# Patient Record
Sex: FEMALE | Race: WHITE | NOT HISPANIC OR LATINO | Employment: OTHER | URBAN - METROPOLITAN AREA
[De-identification: names, ages, dates, MRNs, and addresses within clinical notes are randomized per-mention and may not be internally consistent; named-entity substitution may affect disease eponyms.]

---

## 2024-01-18 ENCOUNTER — APPOINTMENT (OUTPATIENT)
Dept: RADIOLOGY | Facility: HOSPITAL | Age: 74
End: 2024-01-18
Payer: MEDICARE

## 2024-01-18 ENCOUNTER — PHARMACY VISIT (OUTPATIENT)
Dept: PHARMACY | Facility: CLINIC | Age: 74
End: 2024-01-18

## 2024-01-18 ENCOUNTER — APPOINTMENT (OUTPATIENT)
Dept: CARDIOLOGY | Facility: HOSPITAL | Age: 74
End: 2024-01-18
Payer: MEDICARE

## 2024-01-18 ENCOUNTER — HOSPITAL ENCOUNTER (EMERGENCY)
Facility: HOSPITAL | Age: 74
Discharge: HOME | End: 2024-01-18
Attending: EMERGENCY MEDICINE
Payer: MEDICARE

## 2024-01-18 VITALS
DIASTOLIC BLOOD PRESSURE: 80 MMHG | OXYGEN SATURATION: 95 % | WEIGHT: 230 LBS | RESPIRATION RATE: 21 BRPM | BODY MASS INDEX: 40.75 KG/M2 | TEMPERATURE: 98.2 F | SYSTOLIC BLOOD PRESSURE: 129 MMHG | HEART RATE: 100 BPM | HEIGHT: 63 IN

## 2024-01-18 DIAGNOSIS — J18.9 PNEUMONIA DUE TO INFECTIOUS ORGANISM, UNSPECIFIED LATERALITY, UNSPECIFIED PART OF LUNG: ICD-10-CM

## 2024-01-18 DIAGNOSIS — J06.9 UPPER RESPIRATORY TRACT INFECTION, UNSPECIFIED TYPE: ICD-10-CM

## 2024-01-18 DIAGNOSIS — R06.02 SHORTNESS OF BREATH: Primary | ICD-10-CM

## 2024-01-18 PROBLEM — E03.9 ACQUIRED HYPOTHYROIDISM: Status: ACTIVE | Noted: 2021-06-21

## 2024-01-18 PROBLEM — D47.2 MONOCLONAL GAMMOPATHY OF UNKNOWN SIGNIFICANCE (MGUS): Status: ACTIVE | Noted: 2018-04-16

## 2024-01-18 LAB
ALBUMIN SERPL BCP-MCNC: 3.9 G/DL (ref 3.4–5)
ALP SERPL-CCNC: 64 U/L (ref 33–136)
ALT SERPL W P-5'-P-CCNC: 20 U/L (ref 7–45)
ANION GAP SERPL CALC-SCNC: 14 MMOL/L (ref 10–20)
AST SERPL W P-5'-P-CCNC: 19 U/L (ref 9–39)
BILIRUB SERPL-MCNC: 0.7 MG/DL (ref 0–1.2)
BNP SERPL-MCNC: 43 PG/ML (ref 0–99)
BUN SERPL-MCNC: 14 MG/DL (ref 6–23)
CALCIUM SERPL-MCNC: 9.3 MG/DL (ref 8.6–10.3)
CARDIAC TROPONIN I PNL SERPL HS: 11 NG/L (ref 0–13)
CHLORIDE SERPL-SCNC: 99 MMOL/L (ref 98–107)
CO2 SERPL-SCNC: 25 MMOL/L (ref 21–32)
CREAT SERPL-MCNC: 0.99 MG/DL (ref 0.5–1.05)
EGFRCR SERPLBLD CKD-EPI 2021: 60 ML/MIN/1.73M*2
FLUAV RNA RESP QL NAA+PROBE: NOT DETECTED
FLUBV RNA RESP QL NAA+PROBE: NOT DETECTED
GLUCOSE SERPL-MCNC: 130 MG/DL (ref 74–99)
INR PPP: 1.1 (ref 0.9–1.1)
MAGNESIUM SERPL-MCNC: 1.6 MG/DL (ref 1.6–2.4)
POTASSIUM SERPL-SCNC: 3.8 MMOL/L (ref 3.5–5.3)
PROT SERPL-MCNC: 7.5 G/DL (ref 6.4–8.2)
PROTHROMBIN TIME: 12.9 SECONDS (ref 9.8–12.8)
RSV RNA RESP QL NAA+PROBE: NOT DETECTED
SARS-COV-2 RNA RESP QL NAA+PROBE: NOT DETECTED
SODIUM SERPL-SCNC: 134 MMOL/L (ref 136–145)

## 2024-01-18 PROCEDURE — 85610 PROTHROMBIN TIME: CPT | Performed by: EMERGENCY MEDICINE

## 2024-01-18 PROCEDURE — RXMED WILLOW AMBULATORY MEDICATION CHARGE

## 2024-01-18 PROCEDURE — 36415 COLL VENOUS BLD VENIPUNCTURE: CPT | Performed by: EMERGENCY MEDICINE

## 2024-01-18 PROCEDURE — 87637 SARSCOV2&INF A&B&RSV AMP PRB: CPT | Performed by: EMERGENCY MEDICINE

## 2024-01-18 PROCEDURE — 99284 EMERGENCY DEPT VISIT MOD MDM: CPT | Performed by: EMERGENCY MEDICINE

## 2024-01-18 PROCEDURE — 83880 ASSAY OF NATRIURETIC PEPTIDE: CPT | Performed by: EMERGENCY MEDICINE

## 2024-01-18 PROCEDURE — 84484 ASSAY OF TROPONIN QUANT: CPT | Performed by: EMERGENCY MEDICINE

## 2024-01-18 PROCEDURE — 93005 ELECTROCARDIOGRAM TRACING: CPT

## 2024-01-18 PROCEDURE — 80053 COMPREHEN METABOLIC PANEL: CPT | Performed by: EMERGENCY MEDICINE

## 2024-01-18 PROCEDURE — 99283 EMERGENCY DEPT VISIT LOW MDM: CPT

## 2024-01-18 PROCEDURE — 71046 X-RAY EXAM CHEST 2 VIEWS: CPT | Performed by: STUDENT IN AN ORGANIZED HEALTH CARE EDUCATION/TRAINING PROGRAM

## 2024-01-18 PROCEDURE — 71046 X-RAY EXAM CHEST 2 VIEWS: CPT

## 2024-01-18 PROCEDURE — 2500000001 HC RX 250 WO HCPCS SELF ADMINISTERED DRUGS (ALT 637 FOR MEDICARE OP): Performed by: EMERGENCY MEDICINE

## 2024-01-18 PROCEDURE — 83735 ASSAY OF MAGNESIUM: CPT | Performed by: EMERGENCY MEDICINE

## 2024-01-18 PROCEDURE — 2500000001 HC RX 250 WO HCPCS SELF ADMINISTERED DRUGS (ALT 637 FOR MEDICARE OP)

## 2024-01-18 RX ORDER — DOXYCYCLINE 100 MG/1
100 CAPSULE ORAL 2 TIMES DAILY
Qty: 13 CAPSULE | Refills: 0 | Status: SHIPPED | OUTPATIENT
Start: 2024-01-18 | End: 2024-01-25

## 2024-01-18 RX ORDER — METOCLOPRAMIDE 10 MG/1
5 TABLET ORAL ONCE
Status: COMPLETED | OUTPATIENT
Start: 2024-01-18 | End: 2024-01-18

## 2024-01-18 RX ORDER — ROSUVASTATIN CALCIUM 5 MG/1
5 TABLET, COATED ORAL
COMMUNITY
Start: 2023-07-06

## 2024-01-18 RX ORDER — METOPROLOL TARTRATE 50 MG/1
1 TABLET ORAL 2 TIMES DAILY
COMMUNITY
Start: 2023-07-06

## 2024-01-18 RX ORDER — LISINOPRIL 40 MG/1
40 TABLET ORAL
COMMUNITY
Start: 2023-07-06

## 2024-01-18 RX ORDER — LEVOTHYROXINE SODIUM 75 UG/1
75 TABLET ORAL
COMMUNITY
Start: 2023-07-06

## 2024-01-18 RX ORDER — DOXYCYCLINE HYCLATE 100 MG
100 TABLET ORAL ONCE
Status: COMPLETED | OUTPATIENT
Start: 2024-01-18 | End: 2024-01-18

## 2024-01-18 RX ORDER — NAPROXEN 500 MG/1
TABLET ORAL DAILY
COMMUNITY
Start: 2023-07-06

## 2024-01-18 RX ADMIN — METOCLOPRAMIDE 5 MG: 10 TABLET ORAL at 09:57

## 2024-01-18 RX ADMIN — DOXYCYCLINE HYCLATE 100 MG: 100 TABLET, COATED ORAL at 11:41

## 2024-01-18 ASSESSMENT — LIFESTYLE VARIABLES
HAVE PEOPLE ANNOYED YOU BY CRITICIZING YOUR DRINKING: NO
EVER HAD A DRINK FIRST THING IN THE MORNING TO STEADY YOUR NERVES TO GET RID OF A HANGOVER: NO
HAVE YOU EVER FELT YOU SHOULD CUT DOWN ON YOUR DRINKING: NO
EVER FELT BAD OR GUILTY ABOUT YOUR DRINKING: NO
REASON UNABLE TO ASSESS: NO

## 2024-01-18 ASSESSMENT — PAIN DESCRIPTION - LOCATION: LOCATION: CHEST

## 2024-01-18 ASSESSMENT — PAIN DESCRIPTION - ORIENTATION: ORIENTATION: LEFT

## 2024-01-18 ASSESSMENT — PAIN - FUNCTIONAL ASSESSMENT: PAIN_FUNCTIONAL_ASSESSMENT: 0-10

## 2024-01-18 ASSESSMENT — COLUMBIA-SUICIDE SEVERITY RATING SCALE - C-SSRS
6. HAVE YOU EVER DONE ANYTHING, STARTED TO DO ANYTHING, OR PREPARED TO DO ANYTHING TO END YOUR LIFE?: NO
1. IN THE PAST MONTH, HAVE YOU WISHED YOU WERE DEAD OR WISHED YOU COULD GO TO SLEEP AND NOT WAKE UP?: NO
2. HAVE YOU ACTUALLY HAD ANY THOUGHTS OF KILLING YOURSELF?: NO

## 2024-01-18 ASSESSMENT — PAIN DESCRIPTION - PAIN TYPE: TYPE: ACUTE PAIN

## 2024-01-18 ASSESSMENT — PAIN DESCRIPTION - DESCRIPTORS
DESCRIPTORS: ACHING
DESCRIPTORS: ACHING

## 2024-01-18 ASSESSMENT — PAIN SCALES - GENERAL: PAINLEVEL_OUTOF10: 5 - MODERATE PAIN

## 2024-01-18 NOTE — ED PROVIDER NOTES
HPI   Chief Complaint   Patient presents with   • Chest Pain       HPI  Sharon is a 73-year-old female with a past medical history of hypertension, hyperlipidemia, acquired hypothyroidism presenting to the ED with shortness of breath and chest pain.  Reports she has been sick since New Year's but got worse after attending her grandsons birthday party on Sunday.  She started becoming short of breath with some chest pain on Monday which has been progressively worsening.  Describes the chest pain as midsternal, radiating to the back, worse with coughing.  States the worst day was yesterday where she could not catch her breath, and was unable to sleep throughout the night due to her cough. States she is short of breath on exertion, when normally she is active.  When she is exerting herself, or going upstairs she becomes diaphoretic and unable to catch her breath.  Notes that she has a history of pleurisy, pneumonia and bronchitis where she only had cold symptoms with no fevers and then progressed to shortness of breath.  States that she has at home have 8 steps and notes that by the time she got up stairs yesterday she was drenched in sweat with difficulty breathing.  Reports that she has started sleeping on 3 pillows due to difficulty breathing and coughing when she lays flat. Denies any fevers, congestion, diarrhea, constipation, dysuria.  Admits to chills, shortness of breath, chest pain, headache, cough, sinus pressure, nausea with coughing. In addition endorses some mid lower abdominal pain but denies any dysuria or frequency, but states that she has urgency at baseline.  Reports her  is also sick requiring antibiotics.                    [place] Coma Scale Score: 15                  Patient History   History reviewed. No pertinent past medical history.  History reviewed. No pertinent surgical history.  No family history on file.  Social History     Tobacco Use   • Smoking status: Never   • Smokeless  "tobacco: Never   Substance Use Topics   • Alcohol use: Not Currently   • Drug use: Never       Physical Exam     Vitals:    01/18/24 0853 01/18/24 1000   BP: 152/86 129/80   Pulse: (!) 117 105   Resp: 20 (!) 27   Temp: 36.8 °C (98.2 °F)    TempSrc: Tympanic    SpO2: 96% 95%   Weight: 104 kg (230 lb)    Height: 1.6 m (5' 3\")         Physical Exam  CONSTITUTIONAL - well nourished, well developed, in mild acute distress  SKIN - normal skin color and pigmentation  HEAD - no trauma, normocephalic  EYES - extraocular muscles are intact, and normal external exam  ENT - normal tongue movement and throat normal, no exudate, nasal passage without discharge and patent  NECK - supple without rigidity, no neck mass was observed,   LUNG - clear to auscultation, no wheezing, no crackles and no rales, good effort  CARDIAC - tachycardic with regular rhythm, no skipped beats,   ABDOMEN - soft, nontender, nondistended, normal bowel sounds,   EXTREMITIES - no edema, no deformities  NEUROLOGICAL - alert, oriented and no focal signs  PSYCHIATRIC - alert, pleasant and cordial, age-appropriate      ED Course & MDM   Diagnoses as of 01/18/24 1114   Shortness of breath   Pneumonia due to infectious organism, unspecified laterality, unspecified part of lung   Upper respiratory tract infection, unspecified type       Medical Decision Making  Patient is a 73-year-old female who is presenting with shortness of breath and chest pain.  Patient has been sick since New Year's which worsened on Monday.  Vital signs on arrival with tachycardia but otherwise within normal limits.  Negative COVID/RSV/flu.  Chest x-ray with possible pneumonia, BNP, troponin both WNL.  Headache resolved with metoclopramide.     Walking pulse ox 93-95  Procedure  Procedures  "

## 2024-01-18 NOTE — ED TRIAGE NOTES
Patient c/o chest pain with SOB that started last night while she was bringing up dog food from the basement. Patient states she became very diaphoretic and could not catch her breath. This AM she states she has a cough with worsening CP and SOB.

## 2024-01-19 LAB
ATRIAL RATE: 115 BPM
P AXIS: 24 DEGREES
P OFFSET: 199 MS
P ONSET: 161 MS
PR INTERVAL: 128 MS
Q ONSET: 215 MS
QRS COUNT: 19 BEATS
QRS DURATION: 76 MS
QT INTERVAL: 302 MS
QTC CALCULATION(BAZETT): 417 MS
QTC FREDERICIA: 374 MS
R AXIS: -39 DEGREES
T AXIS: 26 DEGREES
T OFFSET: 366 MS
VENTRICULAR RATE: 115 BPM

## 2024-02-14 ENCOUNTER — HOSPITAL ENCOUNTER (OUTPATIENT)
Facility: HOSPITAL | Age: 74
Setting detail: OBSERVATION
Discharge: HOME | End: 2024-02-15
Attending: STUDENT IN AN ORGANIZED HEALTH CARE EDUCATION/TRAINING PROGRAM | Admitting: INTERNAL MEDICINE
Payer: MEDICARE

## 2024-02-14 ENCOUNTER — APPOINTMENT (OUTPATIENT)
Dept: CARDIOLOGY | Facility: HOSPITAL | Age: 74
End: 2024-02-14
Payer: MEDICARE

## 2024-02-14 ENCOUNTER — APPOINTMENT (OUTPATIENT)
Dept: RADIOLOGY | Facility: HOSPITAL | Age: 74
End: 2024-02-14
Payer: MEDICARE

## 2024-02-14 DIAGNOSIS — E01.0 THYROMEGALY: ICD-10-CM

## 2024-02-14 DIAGNOSIS — K04.7 DENTAL INFECTION: Primary | ICD-10-CM

## 2024-02-14 LAB
ALBUMIN SERPL BCP-MCNC: 4 G/DL (ref 3.4–5)
ALP SERPL-CCNC: 66 U/L (ref 33–136)
ALT SERPL W P-5'-P-CCNC: 18 U/L (ref 7–45)
ANION GAP SERPL CALC-SCNC: 13 MMOL/L (ref 10–20)
AST SERPL W P-5'-P-CCNC: 18 U/L (ref 9–39)
BASOPHILS # BLD AUTO: 0.04 X10*3/UL (ref 0–0.1)
BASOPHILS NFR BLD AUTO: 0.4 %
BILIRUB SERPL-MCNC: 0.6 MG/DL (ref 0–1.2)
BUN SERPL-MCNC: 14 MG/DL (ref 6–23)
CALCIUM SERPL-MCNC: 9.6 MG/DL (ref 8.6–10.3)
CARDIAC TROPONIN I PNL SERPL HS: 7 NG/L (ref 0–13)
CHLORIDE SERPL-SCNC: 102 MMOL/L (ref 98–107)
CO2 SERPL-SCNC: 26 MMOL/L (ref 21–32)
CREAT SERPL-MCNC: 0.96 MG/DL (ref 0.5–1.05)
EGFRCR SERPLBLD CKD-EPI 2021: 62 ML/MIN/1.73M*2
EOSINOPHIL # BLD AUTO: 0.05 X10*3/UL (ref 0–0.4)
EOSINOPHIL NFR BLD AUTO: 0.4 %
ERYTHROCYTE [DISTWIDTH] IN BLOOD BY AUTOMATED COUNT: 13.4 % (ref 11.5–14.5)
GLUCOSE SERPL-MCNC: 108 MG/DL (ref 74–99)
HCT VFR BLD AUTO: 47.2 % (ref 36–46)
HGB BLD-MCNC: 15.4 G/DL (ref 12–16)
IMM GRANULOCYTES # BLD AUTO: 0.03 X10*3/UL (ref 0–0.5)
IMM GRANULOCYTES NFR BLD AUTO: 0.3 % (ref 0–0.9)
LACTATE SERPL-SCNC: 0.7 MMOL/L (ref 0.4–2)
LYMPHOCYTES # BLD AUTO: 1.12 X10*3/UL (ref 0.8–3)
LYMPHOCYTES NFR BLD AUTO: 10.1 %
MAGNESIUM SERPL-MCNC: 1.97 MG/DL (ref 1.6–2.4)
MCH RBC QN AUTO: 28 PG (ref 26–34)
MCHC RBC AUTO-ENTMCNC: 32.6 G/DL (ref 32–36)
MCV RBC AUTO: 86 FL (ref 80–100)
MONOCYTES # BLD AUTO: 1.23 X10*3/UL (ref 0.05–0.8)
MONOCYTES NFR BLD AUTO: 11 %
NEUTROPHILS # BLD AUTO: 8.67 X10*3/UL (ref 1.6–5.5)
NEUTROPHILS NFR BLD AUTO: 77.8 %
NRBC BLD-RTO: 0 /100 WBCS (ref 0–0)
PLATELET # BLD AUTO: 192 X10*3/UL (ref 150–450)
POTASSIUM SERPL-SCNC: 4.2 MMOL/L (ref 3.5–5.3)
PROT SERPL-MCNC: 8 G/DL (ref 6.4–8.2)
RBC # BLD AUTO: 5.5 X10*6/UL (ref 4–5.2)
SODIUM SERPL-SCNC: 137 MMOL/L (ref 136–145)
TSH SERPL-ACNC: 0.55 MIU/L (ref 0.44–3.98)
WBC # BLD AUTO: 11.1 X10*3/UL (ref 4.4–11.3)

## 2024-02-14 PROCEDURE — 84439 ASSAY OF FREE THYROXINE: CPT

## 2024-02-14 PROCEDURE — 99285 EMERGENCY DEPT VISIT HI MDM: CPT | Mod: 25 | Performed by: STUDENT IN AN ORGANIZED HEALTH CARE EDUCATION/TRAINING PROGRAM

## 2024-02-14 PROCEDURE — 96375 TX/PRO/DX INJ NEW DRUG ADDON: CPT | Mod: 59

## 2024-02-14 PROCEDURE — 36415 COLL VENOUS BLD VENIPUNCTURE: CPT

## 2024-02-14 PROCEDURE — 93005 ELECTROCARDIOGRAM TRACING: CPT

## 2024-02-14 PROCEDURE — 94762 N-INVAS EAR/PLS OXIMTRY CONT: CPT

## 2024-02-14 PROCEDURE — 2500000004 HC RX 250 GENERAL PHARMACY W/ HCPCS (ALT 636 FOR OP/ED): Performed by: STUDENT IN AN ORGANIZED HEALTH CARE EDUCATION/TRAINING PROGRAM

## 2024-02-14 PROCEDURE — 84075 ASSAY ALKALINE PHOSPHATASE: CPT

## 2024-02-14 PROCEDURE — 85025 COMPLETE CBC W/AUTO DIFF WBC: CPT

## 2024-02-14 PROCEDURE — 87040 BLOOD CULTURE FOR BACTERIA: CPT | Mod: GEALAB

## 2024-02-14 PROCEDURE — 96366 THER/PROPH/DIAG IV INF ADDON: CPT | Mod: 59

## 2024-02-14 PROCEDURE — 2500000004 HC RX 250 GENERAL PHARMACY W/ HCPCS (ALT 636 FOR OP/ED)

## 2024-02-14 PROCEDURE — 2500000001 HC RX 250 WO HCPCS SELF ADMINISTERED DRUGS (ALT 637 FOR MEDICARE OP)

## 2024-02-14 PROCEDURE — 84484 ASSAY OF TROPONIN QUANT: CPT

## 2024-02-14 PROCEDURE — 83605 ASSAY OF LACTIC ACID: CPT

## 2024-02-14 PROCEDURE — 84443 ASSAY THYROID STIM HORMONE: CPT

## 2024-02-14 PROCEDURE — 70491 CT SOFT TISSUE NECK W/DYE: CPT | Performed by: RADIOLOGY

## 2024-02-14 PROCEDURE — 2550000001 HC RX 255 CONTRASTS: Performed by: STUDENT IN AN ORGANIZED HEALTH CARE EDUCATION/TRAINING PROGRAM

## 2024-02-14 PROCEDURE — G0378 HOSPITAL OBSERVATION PER HR: HCPCS

## 2024-02-14 PROCEDURE — 83735 ASSAY OF MAGNESIUM: CPT

## 2024-02-14 PROCEDURE — 96365 THER/PROPH/DIAG IV INF INIT: CPT | Mod: 59

## 2024-02-14 PROCEDURE — 85652 RBC SED RATE AUTOMATED: CPT

## 2024-02-14 PROCEDURE — 86140 C-REACTIVE PROTEIN: CPT

## 2024-02-14 PROCEDURE — 70491 CT SOFT TISSUE NECK W/DYE: CPT

## 2024-02-14 RX ORDER — KETOROLAC TROMETHAMINE 15 MG/ML
15 INJECTION, SOLUTION INTRAMUSCULAR; INTRAVENOUS EVERY 6 HOURS PRN
Status: DISCONTINUED | OUTPATIENT
Start: 2024-02-14 | End: 2024-02-15 | Stop reason: HOSPADM

## 2024-02-14 RX ORDER — ACETAMINOPHEN 500 MG
5 TABLET ORAL DAILY
Status: DISCONTINUED | OUTPATIENT
Start: 2024-02-14 | End: 2024-02-15 | Stop reason: HOSPADM

## 2024-02-14 RX ORDER — FAMOTIDINE 20 MG/1
20 TABLET, FILM COATED ORAL 2 TIMES DAILY
Status: DISCONTINUED | OUTPATIENT
Start: 2024-02-14 | End: 2024-02-15 | Stop reason: HOSPADM

## 2024-02-14 RX ORDER — FAMOTIDINE 10 MG/ML
20 INJECTION INTRAVENOUS 2 TIMES DAILY
Status: DISCONTINUED | OUTPATIENT
Start: 2024-02-14 | End: 2024-02-15 | Stop reason: HOSPADM

## 2024-02-14 RX ORDER — METRONIDAZOLE 500 MG/100ML
500 INJECTION, SOLUTION INTRAVENOUS EVERY 8 HOURS
Status: DISCONTINUED | OUTPATIENT
Start: 2024-02-14 | End: 2024-02-15

## 2024-02-14 RX ORDER — DIPHENHYDRAMINE HYDROCHLORIDE 50 MG/ML
25 INJECTION INTRAMUSCULAR; INTRAVENOUS ONCE
Status: COMPLETED | OUTPATIENT
Start: 2024-02-14 | End: 2024-02-14

## 2024-02-14 RX ORDER — KETOROLAC TROMETHAMINE 15 MG/ML
15 INJECTION, SOLUTION INTRAMUSCULAR; INTRAVENOUS ONCE
Status: COMPLETED | OUTPATIENT
Start: 2024-02-14 | End: 2024-02-14

## 2024-02-14 RX ORDER — DIPHENHYDRAMINE HCL 25 MG
50 CAPSULE ORAL ONCE
Status: COMPLETED | OUTPATIENT
Start: 2024-02-14 | End: 2024-02-14

## 2024-02-14 RX ORDER — METOPROLOL TARTRATE 25 MG/1
25 TABLET, FILM COATED ORAL 2 TIMES DAILY
Status: DISCONTINUED | OUTPATIENT
Start: 2024-02-14 | End: 2024-02-15 | Stop reason: HOSPADM

## 2024-02-14 RX ORDER — ACETAMINOPHEN 325 MG/1
975 TABLET ORAL ONCE
Status: COMPLETED | OUTPATIENT
Start: 2024-02-14 | End: 2024-02-14

## 2024-02-14 RX ORDER — FAMOTIDINE 10 MG/ML
20 INJECTION INTRAVENOUS ONCE
Status: COMPLETED | OUTPATIENT
Start: 2024-02-14 | End: 2024-02-14

## 2024-02-14 RX ORDER — MORPHINE SULFATE 2 MG/ML
2 INJECTION, SOLUTION INTRAMUSCULAR; INTRAVENOUS EVERY 4 HOURS PRN
Status: DISCONTINUED | OUTPATIENT
Start: 2024-02-14 | End: 2024-02-15 | Stop reason: HOSPADM

## 2024-02-14 RX ORDER — POLYETHYLENE GLYCOL 3350 17 G/17G
17 POWDER, FOR SOLUTION ORAL DAILY
Status: DISCONTINUED | OUTPATIENT
Start: 2024-02-14 | End: 2024-02-15 | Stop reason: HOSPADM

## 2024-02-14 RX ORDER — LEVOFLOXACIN 5 MG/ML
750 INJECTION, SOLUTION INTRAVENOUS EVERY 24 HOURS
Status: DISCONTINUED | OUTPATIENT
Start: 2024-02-14 | End: 2024-02-15

## 2024-02-14 RX ORDER — OXYCODONE HYDROCHLORIDE 5 MG/1
5 TABLET ORAL ONCE
Status: COMPLETED | OUTPATIENT
Start: 2024-02-14 | End: 2024-02-14

## 2024-02-14 RX ORDER — ALBUTEROL SULFATE 0.83 MG/ML
2.5 SOLUTION RESPIRATORY (INHALATION) EVERY 6 HOURS PRN
Status: DISCONTINUED | OUTPATIENT
Start: 2024-02-14 | End: 2024-02-15

## 2024-02-14 RX ORDER — METOPROLOL TARTRATE 50 MG/1
50 TABLET ORAL 2 TIMES DAILY
Status: CANCELLED | OUTPATIENT
Start: 2024-02-14

## 2024-02-14 RX ORDER — EPINEPHRINE 1 MG/ML
0.3 INJECTION INTRAMUSCULAR; INTRAVENOUS; SUBCUTANEOUS ONCE
Status: COMPLETED | OUTPATIENT
Start: 2024-02-14 | End: 2024-02-14

## 2024-02-14 RX ORDER — SODIUM CHLORIDE, SODIUM LACTATE, POTASSIUM CHLORIDE, CALCIUM CHLORIDE 600; 310; 30; 20 MG/100ML; MG/100ML; MG/100ML; MG/100ML
75 INJECTION, SOLUTION INTRAVENOUS CONTINUOUS
Status: DISCONTINUED | OUTPATIENT
Start: 2024-02-14 | End: 2024-02-15 | Stop reason: HOSPADM

## 2024-02-14 RX ORDER — ENOXAPARIN SODIUM 100 MG/ML
40 INJECTION SUBCUTANEOUS EVERY 12 HOURS SCHEDULED
Status: DISCONTINUED | OUTPATIENT
Start: 2024-02-14 | End: 2024-02-15 | Stop reason: HOSPADM

## 2024-02-14 RX ADMIN — AMPICILLIN SODIUM AND SULBACTAM SODIUM 3 G: 2; 1 INJECTION, POWDER, FOR SOLUTION INTRAMUSCULAR; INTRAVENOUS at 17:31

## 2024-02-14 RX ADMIN — SODIUM CHLORIDE, POTASSIUM CHLORIDE, SODIUM LACTATE AND CALCIUM CHLORIDE 75 ML/HR: 600; 310; 30; 20 INJECTION, SOLUTION INTRAVENOUS at 21:14

## 2024-02-14 RX ADMIN — DIPHENHYDRAMINE HYDROCHLORIDE 50 MG: 25 CAPSULE ORAL at 23:53

## 2024-02-14 RX ADMIN — ACETAMINOPHEN 975 MG: 325 TABLET ORAL at 19:45

## 2024-02-14 RX ADMIN — KETOROLAC TROMETHAMINE 15 MG: 15 INJECTION, SOLUTION INTRAMUSCULAR; INTRAVENOUS at 21:15

## 2024-02-14 RX ADMIN — IOHEXOL 75 ML: 350 INJECTION, SOLUTION INTRAVENOUS at 18:41

## 2024-02-14 RX ADMIN — FAMOTIDINE 20 MG: 10 INJECTION, SOLUTION INTRAVENOUS at 20:10

## 2024-02-14 RX ADMIN — SODIUM CHLORIDE, POTASSIUM CHLORIDE, SODIUM LACTATE AND CALCIUM CHLORIDE 1000 ML: 600; 310; 30; 20 INJECTION, SOLUTION INTRAVENOUS at 17:23

## 2024-02-14 RX ADMIN — OXYCODONE HYDROCHLORIDE 5 MG: 5 TABLET ORAL at 17:11

## 2024-02-14 RX ADMIN — METHYLPREDNISOLONE SODIUM SUCCINATE 40 MG: 40 INJECTION, POWDER, FOR SOLUTION INTRAMUSCULAR; INTRAVENOUS at 23:54

## 2024-02-14 RX ADMIN — DIPHENHYDRAMINE HYDROCHLORIDE 25 MG: 50 INJECTION INTRAMUSCULAR; INTRAVENOUS at 20:10

## 2024-02-14 RX ADMIN — METOPROLOL TARTRATE 25 MG: 25 TABLET, FILM COATED ORAL at 22:50

## 2024-02-14 RX ADMIN — KETOROLAC TROMETHAMINE 15 MG: 15 INJECTION, SOLUTION INTRAMUSCULAR; INTRAVENOUS at 17:31

## 2024-02-14 RX ADMIN — EPINEPHRINE 0.3 MG: 1 INJECTION INTRAMUSCULAR; INTRAVENOUS; SUBCUTANEOUS at 23:53

## 2024-02-14 RX ADMIN — ENOXAPARIN SODIUM 40 MG: 40 INJECTION SUBCUTANEOUS at 21:18

## 2024-02-14 SDOH — SOCIAL STABILITY: SOCIAL INSECURITY: WERE YOU ABLE TO COMPLETE ALL THE BEHAVIORAL HEALTH SCREENINGS?: YES

## 2024-02-14 SDOH — SOCIAL STABILITY: SOCIAL INSECURITY: HAS ANYONE EVER THREATENED TO HURT YOUR FAMILY OR YOUR PETS?: NO

## 2024-02-14 SDOH — SOCIAL STABILITY: SOCIAL INSECURITY: DO YOU FEEL ANYONE HAS EXPLOITED OR TAKEN ADVANTAGE OF YOU FINANCIALLY OR OF YOUR PERSONAL PROPERTY?: NO

## 2024-02-14 SDOH — SOCIAL STABILITY: SOCIAL INSECURITY: ARE YOU OR HAVE YOU BEEN THREATENED OR ABUSED PHYSICALLY, EMOTIONALLY, OR SEXUALLY BY ANYONE?: NO

## 2024-02-14 SDOH — SOCIAL STABILITY: SOCIAL INSECURITY: ARE THERE ANY APPARENT SIGNS OF INJURIES/BEHAVIORS THAT COULD BE RELATED TO ABUSE/NEGLECT?: NO

## 2024-02-14 SDOH — SOCIAL STABILITY: SOCIAL INSECURITY: HAVE YOU HAD THOUGHTS OF HARMING ANYONE ELSE?: NO

## 2024-02-14 SDOH — SOCIAL STABILITY: SOCIAL INSECURITY: ABUSE: ADULT

## 2024-02-14 SDOH — SOCIAL STABILITY: SOCIAL INSECURITY: DO YOU FEEL UNSAFE GOING BACK TO THE PLACE WHERE YOU ARE LIVING?: NO

## 2024-02-14 SDOH — SOCIAL STABILITY: SOCIAL INSECURITY: DOES ANYONE TRY TO KEEP YOU FROM HAVING/CONTACTING OTHER FRIENDS OR DOING THINGS OUTSIDE YOUR HOME?: NO

## 2024-02-14 ASSESSMENT — COGNITIVE AND FUNCTIONAL STATUS - GENERAL
DAILY ACTIVITIY SCORE: 24
MOBILITY SCORE: 24
PATIENT BASELINE BEDBOUND: NO

## 2024-02-14 ASSESSMENT — PAIN - FUNCTIONAL ASSESSMENT
PAIN_FUNCTIONAL_ASSESSMENT: 0-10

## 2024-02-14 ASSESSMENT — ACTIVITIES OF DAILY LIVING (ADL)
ADEQUATE_TO_COMPLETE_ADL: YES
PATIENT'S MEMORY ADEQUATE TO SAFELY COMPLETE DAILY ACTIVITIES?: YES
TOILETING: INDEPENDENT
WALKS IN HOME: INDEPENDENT
ASSISTIVE_DEVICE: DENTURES PARTIAL
HEARING - LEFT EAR: FUNCTIONAL
LACK_OF_TRANSPORTATION: NO
BATHING: INDEPENDENT
GROOMING: INDEPENDENT
HEARING - RIGHT EAR: FUNCTIONAL
DRESSING YOURSELF: INDEPENDENT
JUDGMENT_ADEQUATE_SAFELY_COMPLETE_DAILY_ACTIVITIES: YES
FEEDING YOURSELF: INDEPENDENT

## 2024-02-14 ASSESSMENT — PATIENT HEALTH QUESTIONNAIRE - PHQ9
1. LITTLE INTEREST OR PLEASURE IN DOING THINGS: NOT AT ALL
SUM OF ALL RESPONSES TO PHQ9 QUESTIONS 1 & 2: 0
2. FEELING DOWN, DEPRESSED OR HOPELESS: NOT AT ALL

## 2024-02-14 ASSESSMENT — LIFESTYLE VARIABLES
HOW OFTEN DO YOU HAVE A DRINK CONTAINING ALCOHOL: NEVER
AUDIT-C TOTAL SCORE: 0
EVER FELT BAD OR GUILTY ABOUT YOUR DRINKING: NO
AUDIT-C TOTAL SCORE: 0
EVER HAD A DRINK FIRST THING IN THE MORNING TO STEADY YOUR NERVES TO GET RID OF A HANGOVER: NO
HAVE YOU EVER FELT YOU SHOULD CUT DOWN ON YOUR DRINKING: NO
SKIP TO QUESTIONS 9-10: 1
HAVE PEOPLE ANNOYED YOU BY CRITICIZING YOUR DRINKING: NO
HOW MANY STANDARD DRINKS CONTAINING ALCOHOL DO YOU HAVE ON A TYPICAL DAY: PATIENT DOES NOT DRINK
HOW OFTEN DO YOU HAVE 6 OR MORE DRINKS ON ONE OCCASION: NEVER

## 2024-02-14 ASSESSMENT — PAIN SCALES - GENERAL
PAINLEVEL_OUTOF10: 6
PAINLEVEL_OUTOF10: 3
PAINLEVEL_OUTOF10: 9

## 2024-02-14 ASSESSMENT — PAIN DESCRIPTION - LOCATION: LOCATION: MOUTH

## 2024-02-14 ASSESSMENT — PAIN DESCRIPTION - ORIENTATION: ORIENTATION: RIGHT

## 2024-02-14 ASSESSMENT — PAIN DESCRIPTION - DESCRIPTORS: DESCRIPTORS: THROBBING

## 2024-02-14 ASSESSMENT — PAIN DESCRIPTION - ONSET: ONSET: GRADUAL

## 2024-02-14 ASSESSMENT — PAIN DESCRIPTION - FREQUENCY: FREQUENCY: ONCE A WEEK

## 2024-02-14 NOTE — ED PROVIDER NOTES
HPI   Chief Complaint   Patient presents with    Oral Swelling     Pt has had mouth pain for 1 week. Pt went to dentist Monday and had a panoramic xray done. Pt told it was normal. Pt went to primary today and they  sent her to ER for possible Ludwigs. Pt is very anxious from the pain and the primary telling her how serious this could be. Pt is alert and oriented in room on monitor at this time.       73f with hypothyroidism, htn, hld presents due to lower dental pain and lower jaw swelling. She has had dental pain since Sat 2/10 and went to a dentist Monday, got xrays and ibuprofen for pain, no abx. Pt was recently seen 1/2024 for PNA and got abx including doxycycline finished around 2/1.   Pt saw her PCP today who advised her to go to the ED due to lower jaw area swelling, it has worsened in the last 1-2 days and has pain at night more than usual. Has had no fever but recent chills and states has had trouble swallowing since last night. Had some mild abd pain 2 days ago LLQ and thinks she is constipated, has daily bm though. No diarrhea. No CP, no dyspnea or cough. Tried salt water gargles and ice, can't brush teeth due to pain, ibuprofen not helping.   Hx allergy to penicillin rash as a child has not tried as adult. Does not tolerate Tylenol with codeine well stomach issues.                             [place] Coma Scale Score: 15                     Patient History   History reviewed. No pertinent past medical history.  History reviewed. No pertinent surgical history.  No family history on file.  Social History     Tobacco Use    Smoking status: Never    Smokeless tobacco: Never   Substance Use Topics    Alcohol use: Not Currently    Drug use: Never       Physical Exam   ED Triage Vitals [02/14/24 1645]   Temperature Heart Rate Respirations BP   36.6 °C (97.9 °F) 88 17 (!) 190/100      Pulse Ox Temp Source Heart Rate Source Patient Position   98 % Temporal Monitor --      BP Location FiO2 (%)     -- --        Physical Exam  Vitals reviewed.   Constitutional:       Appearance: She is obese.   HENT:      Head: Normocephalic and atraumatic.      Mouth/Throat:      Mouth: Mucous membranes are moist.      Pharynx: No oropharyngeal exudate or posterior oropharyngeal erythema.      Comments: Mild edema with tenderness to sublingual region   Eyes:      Extraocular Movements: Extraocular movements intact.      Conjunctiva/sclera: Conjunctivae normal.   Cardiovascular:      Rate and Rhythm: Normal rate and regular rhythm.      Heart sounds: No murmur heard.     No friction rub. No gallop.   Pulmonary:      Effort: Pulmonary effort is normal.      Breath sounds: Normal breath sounds. No wheezing, rhonchi or rales.   Abdominal:      General: Bowel sounds are normal.      Palpations: Abdomen is soft. There is no mass.      Tenderness: There is no abdominal tenderness. There is no guarding or rebound.   Musculoskeletal:         General: No tenderness.      Right lower leg: No edema.      Left lower leg: No edema.   Skin:     General: Skin is warm and dry.      Findings: No bruising or rash.   Neurological:      Mental Status: She is alert. Mental status is at baseline.      Comments: Answering questions, following commands. Moving all extremities.    Psychiatric:         Mood and Affect: Mood and affect normal.         ED Course & MDM   Diagnoses as of 02/14/24 2016   Dental infection       Medical Decision Making  73f with hypothyroidism, htn, hld presents due to lower dental pain and lower jaw swelling. She has had dental pain since Sat 2/10 and went to a dentist Monday, got xrays and ibuprofen for pain, no abx. Pt was recently seen 1/2024 for PNA and got abx including doxycycline finished around 2/1.   Pt saw her PCP today who advised her to go to the ED due to lower jaw area swelling, it has worsened in the last 1-2 days and has pain at night more than usual. Has had no fever but recent chills and states has had trouble  swallowing since last night. Had some mild abd pain 2 days ago LLQ and thinks she is constipated, has daily bm though. No diarrhea. No CP, no dyspnea or cough. Tried salt water gargles and ice, can't brush teeth due to pain, ibuprofen not helping.   Hx allergy to penicillin rash as a child has not tried as adult. Does not tolerate Tylenol with codeine well stomach issues.     Vitals initially 190/100 likely due to pain, improved with analgesics. 97.9f, 88 hr, 17 rr, 98% on room air. Labs largely unremarkable, TSH WNL, trop neg x1. ct neck w/ contrast showed soft tissue swelling of mentum of mandible including the upper floor of mouth region. Significantly enlarged thyroid gland.   Given oxy 5mg and toradol 15mg iv for jaw pain. Started on 1L IV fluids and Unasyn in ED for trial as pt had hives as a child to penicillin; after that pt had some CP and upper back pain; EKG done and trop repeat ordered. Given pepcid 20, benadryl 25 and 500ml ivf. Suggest NOT continue penicillin and keep allergy on record.   [facility] contacted about CT neck results and agree to see pt in the hospital. Pt agrees with admission for observation.       Amount and/or Complexity of Data Reviewed  External Data Reviewed: labs.  Labs: ordered.  Radiology: ordered.  ECG/medicine tests: ordered.        Procedure  Procedures     Jose Hoffman DO  Resident  02/14/24 2015       Jose Hoffman DO  Resident  02/14/24 2017

## 2024-02-14 NOTE — ED TRIAGE NOTES
Pt has had mouth pain for 1 week. Pt went to dentist Monday and had a panoramic xray done. Pt told it was normal. Pt went to primary today and they  sent her to ER for possible Ludwigs. Pt is very anxious from the pain and the primary telling her how serious this could be. Pt is alert and oriented in room on monitor at this time. Pt is speaking clear.

## 2024-02-15 ENCOUNTER — PHARMACY VISIT (OUTPATIENT)
Dept: PHARMACY | Facility: CLINIC | Age: 74
End: 2024-02-15

## 2024-02-15 ENCOUNTER — APPOINTMENT (OUTPATIENT)
Dept: RADIOLOGY | Facility: HOSPITAL | Age: 74
End: 2024-02-15
Payer: MEDICARE

## 2024-02-15 VITALS
SYSTOLIC BLOOD PRESSURE: 144 MMHG | HEART RATE: 75 BPM | BODY MASS INDEX: 43.48 KG/M2 | HEIGHT: 63 IN | WEIGHT: 245.37 LBS | DIASTOLIC BLOOD PRESSURE: 85 MMHG | OXYGEN SATURATION: 94 % | TEMPERATURE: 97.7 F | RESPIRATION RATE: 18 BRPM

## 2024-02-15 LAB
ATRIAL RATE: 91 BPM
CRP SERPL-MCNC: 9.41 MG/DL
ERYTHROCYTE [SEDIMENTATION RATE] IN BLOOD BY WESTERGREN METHOD: 32 MM/H (ref 0–30)
P AXIS: 45 DEGREES
P OFFSET: 184 MS
P ONSET: 138 MS
PR INTERVAL: 146 MS
Q ONSET: 211 MS
QRS COUNT: 15 BEATS
QRS DURATION: 76 MS
QT INTERVAL: 352 MS
QTC CALCULATION(BAZETT): 432 MS
QTC FREDERICIA: 404 MS
R AXIS: -43 DEGREES
T AXIS: 9 DEGREES
T OFFSET: 387 MS
T4 FREE SERPL-MCNC: 1.14 NG/DL (ref 0.61–1.12)
VENTRICULAR RATE: 91 BPM

## 2024-02-15 PROCEDURE — 2500000001 HC RX 250 WO HCPCS SELF ADMINISTERED DRUGS (ALT 637 FOR MEDICARE OP)

## 2024-02-15 PROCEDURE — 96376 TX/PRO/DX INJ SAME DRUG ADON: CPT | Mod: 59

## 2024-02-15 PROCEDURE — 99236 HOSP IP/OBS SAME DATE HI 85: CPT

## 2024-02-15 PROCEDURE — 2500000004 HC RX 250 GENERAL PHARMACY W/ HCPCS (ALT 636 FOR OP/ED)

## 2024-02-15 PROCEDURE — G0378 HOSPITAL OBSERVATION PER HR: HCPCS

## 2024-02-15 PROCEDURE — 96367 TX/PROPH/DG ADDL SEQ IV INF: CPT | Mod: 59

## 2024-02-15 PROCEDURE — 71045 X-RAY EXAM CHEST 1 VIEW: CPT

## 2024-02-15 PROCEDURE — RXMED WILLOW AMBULATORY MEDICATION CHARGE

## 2024-02-15 PROCEDURE — 71045 X-RAY EXAM CHEST 1 VIEW: CPT | Performed by: STUDENT IN AN ORGANIZED HEALTH CARE EDUCATION/TRAINING PROGRAM

## 2024-02-15 RX ORDER — OXYCODONE HYDROCHLORIDE 5 MG/1
5 TABLET ORAL EVERY 6 HOURS PRN
Qty: 5 TABLET | Refills: 0 | Status: SHIPPED | OUTPATIENT
Start: 2024-02-15

## 2024-02-15 RX ORDER — CLINDAMYCIN HYDROCHLORIDE 300 MG/1
300 CAPSULE ORAL EVERY 6 HOURS
Qty: 20 CAPSULE | Refills: 0 | Status: SHIPPED | OUTPATIENT
Start: 2024-02-15

## 2024-02-15 RX ORDER — NAPROXEN 250 MG/1
250 TABLET ORAL DAILY
Status: DISCONTINUED | OUTPATIENT
Start: 2024-02-15 | End: 2024-02-15 | Stop reason: HOSPADM

## 2024-02-15 RX ORDER — ALBUTEROL SULFATE 0.83 MG/ML
2.5 SOLUTION RESPIRATORY (INHALATION) EVERY 2 HOUR PRN
Status: DISCONTINUED | OUTPATIENT
Start: 2024-02-15 | End: 2024-02-15 | Stop reason: HOSPADM

## 2024-02-15 RX ORDER — DIPHENHYDRAMINE HCL 25 MG
25 CAPSULE ORAL EVERY 4 HOURS PRN
Status: DISCONTINUED | OUTPATIENT
Start: 2024-02-15 | End: 2024-02-15 | Stop reason: HOSPADM

## 2024-02-15 RX ORDER — LISINOPRIL 20 MG/1
40 TABLET ORAL DAILY
Status: DISCONTINUED | OUTPATIENT
Start: 2024-02-15 | End: 2024-02-15 | Stop reason: HOSPADM

## 2024-02-15 RX ORDER — CLINDAMYCIN HYDROCHLORIDE 300 MG/1
300 CAPSULE ORAL EVERY 6 HOURS
Status: DISCONTINUED | OUTPATIENT
Start: 2024-02-15 | End: 2024-02-15 | Stop reason: HOSPADM

## 2024-02-15 RX ORDER — ROSUVASTATIN CALCIUM 10 MG/1
5 TABLET, COATED ORAL DAILY
Status: DISCONTINUED | OUTPATIENT
Start: 2024-02-15 | End: 2024-02-15 | Stop reason: HOSPADM

## 2024-02-15 RX ORDER — LEVOTHYROXINE SODIUM 75 UG/1
75 TABLET ORAL
Status: DISCONTINUED | OUTPATIENT
Start: 2024-02-15 | End: 2024-02-15 | Stop reason: HOSPADM

## 2024-02-15 RX ORDER — PREDNISONE 5 MG/1
5 TABLET ORAL DAILY
Qty: 5 TABLET | Refills: 0 | Status: SHIPPED | OUTPATIENT
Start: 2024-02-15 | End: 2024-02-20

## 2024-02-15 RX ADMIN — CLINDAMYCIN HYDROCHLORIDE 300 MG: 300 CAPSULE ORAL at 11:35

## 2024-02-15 RX ADMIN — METOPROLOL TARTRATE 25 MG: 25 TABLET, FILM COATED ORAL at 08:27

## 2024-02-15 RX ADMIN — LEVOFLOXACIN 750 MG: 750 INJECTION, SOLUTION INTRAVENOUS at 06:08

## 2024-02-15 RX ADMIN — KETOROLAC TROMETHAMINE 15 MG: 15 INJECTION, SOLUTION INTRAMUSCULAR; INTRAVENOUS at 02:34

## 2024-02-15 RX ADMIN — METRONIDAZOLE 500 MG: 500 INJECTION, SOLUTION INTRAVENOUS at 02:26

## 2024-02-15 RX ADMIN — LEVOTHYROXINE SODIUM 75 MCG: 75 TABLET ORAL at 06:08

## 2024-02-15 RX ADMIN — METRONIDAZOLE 500 MG: 500 INJECTION, SOLUTION INTRAVENOUS at 10:16

## 2024-02-15 RX ADMIN — LISINOPRIL 40 MG: 20 TABLET ORAL at 08:27

## 2024-02-15 ASSESSMENT — COGNITIVE AND FUNCTIONAL STATUS - GENERAL
DAILY ACTIVITIY SCORE: 24
MOBILITY SCORE: 24

## 2024-02-15 ASSESSMENT — PAIN DESCRIPTION - ORIENTATION: ORIENTATION: RIGHT

## 2024-02-15 ASSESSMENT — PAIN SCALES - GENERAL
PAINLEVEL_OUTOF10: 0 - NO PAIN
PAINLEVEL_OUTOF10: 2
PAINLEVEL_OUTOF10: 5 - MODERATE PAIN
PAINLEVEL_OUTOF10: 0 - NO PAIN

## 2024-02-15 ASSESSMENT — PAIN DESCRIPTION - LOCATION: LOCATION: MOUTH

## 2024-02-15 ASSESSMENT — PAIN - FUNCTIONAL ASSESSMENT: PAIN_FUNCTIONAL_ASSESSMENT: 0-10

## 2024-02-15 NOTE — DISCHARGE SUMMARY
Discharge Diagnosis  Dental infection  Submandibular swelling  Thyromegaly    Issues Requiring Follow-Up  Thyromegaly - Dr Walter Perez (Endocrine surgeon) to evaluate thyroid gland and monitor in case resection needed    Discharge Meds     Your medication list        START taking these medications        Instructions Last Dose Given Next Dose Due   clindamycin 300 mg capsule  Commonly known as: Cleocin      Take 1 capsule (300 mg) by mouth every 6 hours.       oxyCODONE 5 mg immediate release tablet  Commonly known as: Roxicodone      Take 1 tablet (5 mg) by mouth every 6 hours if needed for moderate pain (4 - 6).       predniSONE 5 mg tablet  Commonly known as: Deltasone      Take 1 tablet (5 mg) by mouth once daily for 5 days.              CONTINUE taking these medications        Instructions Last Dose Given Next Dose Due   levothyroxine 75 mcg tablet  Commonly known as: Synthroid, Levoxyl           lisinopril 40 mg tablet           metoprolol tartrate 50 mg tablet  Commonly known as: Lopressor           naproxen 500 mg tablet  Commonly known as: Naprosyn           rosuvastatin 5 mg tablet  Commonly known as: Crestor                     Where to Get Your Medications        These medications were sent to [facility]  [address], [facility] [address]      Hours: 9 AM to 5 PM Mon-Fri Phone: [phone]   clindamycin 300 mg capsule  oxyCODONE 5 mg immediate release tablet  predniSONE 5 mg tablet         Test Results Pending At Discharge  Pending Labs       Order Current Status    Blood Culture Preliminary result    Blood Culture Preliminary result            Hospital Course  Patient was admitted with periodontal edema and pain. Pain had been severe and ongoing for 5 days. Treated for periodontal infection with IV antibiotics and analgesia. Eval[place] by Dr [facility] who favored sinus infection vs mandibular arthritis. Improved on antibiotics since admission thus will complete course. Discharged  home in stable condition with 5 days clindamycin, 5 days prednisone 5mg, and a small number of oxycodone for analgesia should the pain return. Advised to follow up with PCP within 4-6 weeks. Also advised to see endocrine surgeon to discuss thyroid further and surgery if recommended in future.      Pertinent Physical Exam At Time of Discharge  Physical Exam  Constitutional:       General: She is not in acute distress.     Appearance: Normal appearance. She is obese. She is not ill-appearing.   HENT:      Head: Normocephalic and atraumatic.      Nose: Nose normal.      Mouth/Throat:      Mouth: Mucous membranes are moist.      Pharynx: Oropharynx is clear.   Eyes:      Extraocular Movements: Extraocular movements intact.      Conjunctiva/sclera: Conjunctivae normal.      Pupils: Pupils are equal, round, and reactive to light.   Cardiovascular:      Rate and Rhythm: Normal rate and regular rhythm.      Pulses: Normal pulses.      Heart sounds: Normal heart sounds.   Pulmonary:      Effort: Pulmonary effort is normal.      Breath sounds: Normal breath sounds.   Abdominal:      General: Abdomen is flat. Bowel sounds are normal. There is no distension.      Palpations: Abdomen is soft.      Tenderness: There is no abdominal tenderness. There is no guarding or rebound.   Musculoskeletal:         General: Normal range of motion.      Right lower leg: No edema.      Left lower leg: No edema.   Skin:     General: Skin is warm and dry.      Capillary Refill: Capillary refill takes less than 2 seconds.   Neurological:      General: No focal deficit present.      Mental Status: She is alert and oriented to person, place, and time.     Outpatient Follow-Up  Referral made to Dr Walter Perez Surgical Oncology for thyromegaly evaluation      Kendrick Marcus MD

## 2024-02-15 NOTE — PROGRESS NOTES
02/15/24 1305   Discharge Planning   Living Arrangements Spouse/significant other   Support Systems Spouse/significant other   Assistance Needed A&Ox3, independent, room air at baseline   Type of Residence Private residence   Home or Post Acute Services None   Patient expects to be discharged to: Home no needs   Does the patient need discharge transport arranged? No

## 2024-02-15 NOTE — CARE PLAN
The patient's goals for the shift include  remain comfortable    The clinical goals for the shift include pain control

## 2024-02-15 NOTE — H&P
Patient: Brandi Duenas Age: 73 y.o.   Gender: female Room/Bed: 219/219-B     Attending: Gaurang Weber DO  Code Status:  Full Code    Chief Complaint:  Patient: Brandi Duenas is a 73 y.o. female who presented to the hospital for facial swelling and pain    History of Presenting Illness  Brandi Duenas 73-year-old female with past medical history of hypertension, hyperlipidemia, hypothyroidism, who presented to the ED with complaint of dental pain and facial swelling, she stated that her symptoms started on Saturday and it was worse at the nighttime with pain and worsening swelling, she was seen and evaluated at the dentist on Monday but reported that she got x-ray and ibuprofen 650 for pain but it was only subsided her pain for 1 hour, she did not have any recent dental work, and was informed that her teeth are normal and she did not receive antibiotics, patient had a recent hospital visit to the ED with concern of pneumonia for which she received doxycycline and her she visited her PCP and had additional 5 days of doxycycline which she finished on 02/01, after the pain became intolerable she visited her PCP today who advised her to come to the ED for further evaluation, she is currently denying any fever but experiencing some chills, nausea, she finds it very hard to chew because of the pain, but denies any choking episodes or pain with her swallowing, and did not notice any change in her vision or swelling around her eyes.  She received all her childhood vaccinations.    In the ED her blood pressure was elevated, labs were unremarkable CT neck indicated anterior and posterior soft tissues thickening of the mandible  and enlarged thyroid with mild mass effect on the trachea and esophagus.  Blood cultures/TSH were drawn she received 1.5 L of LR, Toradol injections, she reported childhood penicillin allergy(hives), and not able to tolerate Oxycodone, Unasyn was tried in the ED after which she  "experienced some chest/back pain, troponin obtained/negative, Unasyn also stopped and she received Pepcid, Benadryl, and epinephrine, Dr. [facility] [place] and agreed to see the patient in the hospital. Upon assessment she is still experiencing chest congestion and developed new hoarseness of voice, but no difficulty swallowing or difficulty breathing. She is  DNR and Her POA is her .      12 point Review of Systems negative unless stated above.      Past Medical History   History reviewed. No pertinent past medical history.   Past Surgical History:   History reviewed. No pertinent surgical history.  Family History:   No family history on file.  Social History:   Tobacco Use: Low Risk  (2/14/2024)    Patient History     Smoking Tobacco Use: Never     Smokeless Tobacco Use: Never     Passive Exposure: Not on file      Social History     Substance and Sexual Activity   Alcohol Use Not Currently        ED Course   Vitals - /85   Pulse 96   Temp 36.5 °C (97.7 °F) (Tympanic)   Resp 15   Wt 109 kg (240 lb 15.4 oz)   SpO2 97%     Labs:   Results from last 72 hours   Lab Units 02/14/24  1723   SODIUM mmol/L 137   POTASSIUM mmol/L 4.2   CHLORIDE mmol/L 102   CO2 mmol/L 26   BUN mg/dL 14   CREATININE mg/dL 0.96   GLUCOSE mg/dL 108*   CALCIUM mg/dL 9.6   ANION GAP mmol/L 13   EGFR mL/min/1.73m*2 62      Results from last 72 hours   Lab Units 02/14/24  1723   WBC AUTO x10*3/uL 11.1   HEMOGLOBIN g/dL 15.4   HEMATOCRIT % 47.2*   PLATELETS AUTO x10*3/uL 192   NEUTROS PCT AUTO % 77.8   LYMPHS PCT AUTO % 10.1   MONOS PCT AUTO % 11.0   EOS PCT AUTO % 0.4      Lab Results   Component Value Date    CALCIUM 9.6 02/14/2024      No results found for: \"CRP\"   [unfilled]     Micro/ID:   No results found for the last 90 days.                   No lab exists for component: \"AGALPCRNB\"   .ID  No results found for: \"URINECULTURE\", \"BLOODCULT\", \"CSFCULTSMEAR\"    Imaging:   No orders to display     Encounter " Date: 01/18/24   ECG 12 lead   Result Value    Ventricular Rate 115    Atrial Rate 115    DC Interval 128    QRS Duration 76    QT Interval 302    QTC Calculation(Bazett) 417    P Axis 24    R Axis -39    T Axis 26    QRS Count 19    Q Onset 215    P Onset 161    P Offset 199    T Offset 366    QTC Fredericia 374    Narrative    Sinus tachycardia  Possible Left atrial enlargement  Left axis deviation  Abnormal ECG  When compared with ECG of 17-JUL-1995 10:10,  Vent. rate has increased BY  52 BPM  QRS axis Shifted left  See ED provider note for full interpretation and clinical correlation  Confirmed by Eva Geigre ([id number]) on 1/19/2024 10:21:45 AM     No echocardiogram results found for the past 12 months  CT soft tissue neck w IV contrast    Result Date: 2/14/2024  Interpreted By:  Jolynn Mohr, STUDY: CT SOFT TISSUE NECK W IV CONTRAST;  2/14/2024 6:33 pm   INDICATION: Mouth pain for 1 week.   COMPARISON: None.   ACCESSION NUMBER(S): [id number]   ORDERING CLINICIAN: CHANEL ANAYA   TECHNIQUE: Axial CT images of the neck were obtained.  The patient received intravenous contrast agent. The images were reformatted in angled axial, coronal and sagittal planes.   FINDINGS: Oral Cavity, Pharynx and Larynx:  Evaluation oral cavity is limited by streak artifact from dental hardware. Within these limitations, several teeth are missing. There is a periapical lucency involving right-sided mandibular 1st molar tooth.  There is mild nonspecific soft tissue thickening involving the sublingual soft tissues particularly along the dorsal aspect of the mentum. This is nonspecific and could be within the range of normal variation, however underlying abnormality can not be excluded. There is no fluid collection or abnormal enhancement in the floor of mouth region. The hypopharyngeal and laryngeal structures are unremarkable.   There is nonspecific soft tissue thickening overlying the mentum of the mandible with mild fat stranding.  No definite fluid collection is identified in this region. There is no periapical lucency associated with the adjacent teeth. This may be within the range of normal variation, however underlying infectious or inflammatory soft tissue thickening can not be excluded.   Retropharyngeal and Prevertebral Soft Tissues: Unremarkable.   Lymph nodes: There are few non specific bilateral neck nodes, probably reactive in etiology.   Neck vessels: Bilateral neck vessels are normal in course and caliber and appear patent.   Thyroid gland: The thyroid gland is significantly enlarged with heterogenous attenuation. It causes mild mass effect with narrowing of the trachea and mild mass effect on the esophagus. There is retrosternal extension of the thyroid gland in the upper mediastinum. The right thyroid lobe measures 9.3 cm in maximum craniocaudal dimension and 4.2 cm in maximum AP dimension. The left thyroid lobe measures 7.7 cm in craniocaudal dimension and 4.2 cm in AP dimension. There are coarse calcifications within bilateral thyroid lobes.   Parotid and submandibular glands: Bilateral parotid and submandibular glands are unremarkable in appearance.   Paranasal Sinuses and Mastoids: Visualized paranasal sinuses and bilateral mastoids are clear.   Visualized orbital structures are unremarkable.   Visualized upper lungs are clear.   There are multilevel degenerative changes of the cervical spine.       Nonspecific soft tissue thickening along the anterior and posterior aspect of the mentum of mandible including the upper floor of mouth region. This could be within the range of normal variation, however an underlying infectious or inflammatory soft tissue process can not be excluded. There is no lytic lesion of the mandible or associated periapical lucency in this region. There is no rim enhancing fluid collection to suggest an abscess.   Significantly enlarged thyroid gland with heterogenous attenuation and mediastinal  extension. There is mild mass effect on trachea and esophagus.   Signed by: Jolynn Mohr 2/14/2024 6:52 PM Dictation workstation:   [id number]          Interventions:  Medications   lactated Ringer's bolus 500 mL (has no administration in time range)   enoxaparin (Lovenox) syringe 40 mg (40 mg subcutaneous Given 2/14/24 2118)   famotidine (Pepcid) tablet 20 mg (20 mg oral Not Given 2/14/24 2100)     Or   famotidine PF (Pepcid) injection 20 mg ( intravenous See Alternative 2/14/24 2100)   melatonin tablet 5 mg (5 mg oral Not Given 2/14/24 2045)   polyethylene glycol (Glycolax, Miralax) packet 17 g (17 g oral Not Given 2/14/24 2040)   psyllium (Metamucil) 3.4 gram packet 1 packet (1 packet oral Not Given 2/14/24 2040)   benzocaine-menthol (Cepastat Sore Throat) 15-3.6 mg lozenge 1 lozenge (has no administration in time range)   lactated Ringer's infusion (75 mL/hr intravenous New Bag 2/14/24 2114)   metoprolol tartrate (Lopressor) tablet 25 mg (25 mg oral Given 2/14/24 2250)   albuterol 2.5 mg /3 mL (0.083 %) nebulizer solution 2.5 mg (has no administration in time range)   metroNIDAZOLE (Flagyl) 500 mg in NaCl (iso-os) 100 mL (has no administration in time range)   levoFLOXacin (Levaquin)  mg (has no administration in time range)   ketorolac (Toradol) injection 15 mg (has no administration in time range)   morphine injection 2 mg (has no administration in time range)   oxyCODONE (Roxicodone) immediate release tablet 5 mg (5 mg oral Given 2/14/24 1711)   ketorolac (Toradol) injection 15 mg (15 mg intravenous Given 2/14/24 1731)   lactated Ringer's bolus 1,000 mL (0 mL intravenous Stopped 2/14/24 1823)   iohexol (OMNIPaque) 350 mg iodine/mL solution 75 mL (75 mL intravenous Given 2/14/24 1841)   acetaminophen (Tylenol) tablet 975 mg (975 mg oral Given 2/14/24 1945)   diphenhydrAMINE (BENADryl) injection 25 mg (25 mg intravenous Given 2/14/24 2010)   famotidine PF (Pepcid) injection 20 mg (20 mg intravenous  "Given 2/14/24 2010)   ketorolac (Toradol) injection 15 mg (15 mg intravenous Given 2/14/24 2115)   EPINEPHrine (Adrenalin) injection 0.3 mg (0.3 mg intramuscular Given 2/14/24 2353)   diphenhydrAMINE (BENADryl) capsule 50 mg (50 mg oral Given 2/14/24 2353)   methylPREDNISolone sod succinate (PF) (SOLU-Medrol) 40 mg/mL injection 40 mg (40 mg intravenous Given 2/14/24 2354)       Objective Data  Physical Exam  Vitals and nursing note reviewed.   Constitutional:       Appearance: Normal appearance. She is normal weight.   HENT:      Head: Normocephalic and atraumatic.      Jaw: Tenderness, swelling and pain on movement present.      Right Ear: Tympanic membrane normal.      Nose: Nose normal.      Mouth/Throat:      Mouth: Mucous membranes are moist.   Eyes:      Extraocular Movements: Extraocular movements intact.   Cardiovascular:      Rate and Rhythm: Normal rate.      Pulses: Normal pulses.      Heart sounds: Normal heart sounds.   Pulmonary:      Effort: Pulmonary effort is normal.      Breath sounds: Normal breath sounds.   Abdominal:      General: Abdomen is flat. Bowel sounds are normal.      Palpations: Abdomen is soft.   Musculoskeletal:         General: Normal range of motion.   Skin:     General: Skin is warm and dry.      Capillary Refill: Capillary refill takes less than 2 seconds.   Neurological:      General: No focal deficit present.      Mental Status: She is alert and oriented to person, place, and time.   Psychiatric:         Mood and Affect: Mood normal.         Behavior: Behavior normal.         Thought Content: Thought content normal.         Judgment: Judgment normal.          Visit Vitals  /85   Pulse 96   Temp 36.5 °C (97.7 °F) (Tympanic)   Resp 15   Ht 1.6 m (5' 3\")   Wt 109 kg (240 lb 15.4 oz)   SpO2 97%   BMI 42.68 kg/m²   Smoking Status Never   BSA 2.2 m²        No intake or output data in the 24 hours ending 02/14/24 2356          Current Inpatient Medications   Scheduled " medications  enoxaparin, 40 mg, subcutaneous, q12h GEMA  famotidine, 20 mg, oral, BID   Or  famotidine, 20 mg, intravenous, BID  lactated Ringer's, 500 mL, intravenous, Once  levoFLOXacin, 750 mg, intravenous, q24h  melatonin, 5 mg, oral, Daily  metoprolol tartrate, 25 mg, oral, BID  metroNIDAZOLE, 500 mg, intravenous, q8h  polyethylene glycol, 17 g, oral, Daily  psyllium, 1 packet, oral, Daily        Continuous medications  lactated Ringer's, 75 mL/hr, Last Rate: 75 mL/hr (02/14/24 2114)        PRN medications  PRN medications: albuterol, benzocaine-menthol, ketorolac, morphine     Assessment and Plan   Brandi Duenas is a 73 y.o. female presenting for facial swelling and pain, In the ED her blood pressure was elevated, labs were unremarkable CT neck indicated anterior and posterior soft tissues thickening of the mandible with lytic lesions and enlarged thyroid with mild mass effect on the trachea and esophagus. Blood cultures/TSH were drawn she received 1.5 L of LR, Toradol injections, she reported childhood penicillin allergy(hives),Unasyn was tried in the ED after which she experienced some chest/back pain, troponin obtained/negative, Unasyn also stopped and she received Pepcid, Benadryl, and epinephrine, Dr. Mick OLSEN was consulted and agreed to see the patient in the hospital. Upon assessment she is still experiencing chest congestion and developed new hoarseness of voice, but no difficulty swallowing or difficulty breathing.     Acute Medical Issues:  #odontogenic infection/jaw cellulites   #Facial swelling and edema   -Patient evaluated by outside dentist denies any teeth etiology  -Afebrile, hemodynamically stable no leukocytosis  -DC Unasyn, start the patient on Flagyl 500 mg and levofloxacin 750 mg daily  -Follow-up with blood cultures, ESR, CRP  -Follow-up with [facility] recs  -Pain control, easy chewing diet, aspiration precautions    #Thyromegaly  #Hypothyroidism   #remote thyroid nodule   -TSH  obtained in the ED 0.55  -T4, thyroid US to r/o thyroid abscess/thyroiditis or suspicious nodules.   -Cont home thyroxine 75mcg daily     #allergic reaction to penicillin   - patient symptoms started in the ED after receiving Unasyn, received Epi    - BP is stable, no obvious Angioedema but hard to assess due to the baseline swelling   - solumedrol 40mg IV x1 to prevent the delayed immune response   - Benadryl, Pepcid PRN, albuterol  - CXR r/o Edema   - educated the nurse/ cont to watch for any sing of angioedema or HD compromise       #Hypertensive Urgency- improving   - 190/100 in the ED most Likely 2/2 pain   - Cont home metop 25 mg BID, Lisinopril 40 mg daily.       Chronic Medical Issues:   #HLD: Crestor 5 mg daily.   #Constipation: Metamucil daily     Fluids: 1.5 LR   Electrolytes: replete as needed  Nutrition: regular   GI Prophylaxis: Pepcid   DVT Prophylaxis: Lovenox    Access: PIV  Antibiotics: Flagyl/Levofloxacin   Oxygenation: RA    Dispo: patient admitted for facial swelling and edema evaluation, currently receiving IV abx therapy, pending OMFS evaluation, expected LOS<48hrs.

## 2024-02-15 NOTE — DISCHARGE INSTRUCTIONS
You were treated for pain and swelling in and around your mouth. We treated you with antibiotics and some pain medicine. Going home, we have sent you with a prescription for 5 days of antibiotics, 5 days of low intensity steroids, and some analgesic medication. Please take the analgesics if your pain returns and is unbearable, but only if needed. The antibiotics and steroids should be taken for the next 5 days even if the pain does not return. We have made a referral for you to see Dr Perez to evaluate your thyroid gland. We recommend you follow up with your PCP within 4-6 weeks, or sooner if symptoms return.

## 2024-02-19 ENCOUNTER — TELEPHONE (OUTPATIENT)
Dept: INTERNAL MEDICINE | Facility: HOSPITAL | Age: 74
End: 2024-02-19
Payer: MEDICARE

## 2024-02-19 LAB
BACTERIA BLD CULT: NORMAL
BACTERIA BLD CULT: NORMAL

## 2024-02-19 NOTE — TELEPHONE ENCOUNTER
Patient called 2/19/24 to check up on his since hospital discharge.   No response to phone call going to voicemail.  Left message with callback number.   Will update note if patient returns call.       Gaurang Weber DO  Hospitalist, [facility]

## 2024-02-20 ENCOUNTER — TELEPHONE (OUTPATIENT)
Dept: INTERNAL MEDICINE | Facility: HOSPITAL | Age: 74
End: 2024-02-20
Payer: MEDICARE

## 2024-02-20 NOTE — TELEPHONE ENCOUNTER
Patient returned phone call today (2/20/24).  Doing better after discharge. Initial day she indicates that she felt a small bump on her chin area then when she pressed it inward she felt a fluid sensation in her mouth. Feels better now as stated. Finishing up medications as instructed. Says she hasn't even needed to use narcotics for pain because doing better.  Says she saw her PCP about this as well.  She does tell me that prior to hospitalization she now recalls that her young dog may have pawed at her face maybe in the area of the swelling but she completely forgot about it until she left hospital.   In addition she confirms appointment to see Dr. Perez regarding enlarged thyroid which appears to be causing some mild effect on surrounding structures. Thankful the referral was made from hospital.  No further questions concerns or comments at this time.  Wished her well going forward and to take care.    Gaurang Weber DO  Hospitalist, [facility]

## 2024-03-04 ENCOUNTER — OFFICE VISIT (OUTPATIENT)
Dept: SURGERY | Facility: CLINIC | Age: 74
End: 2024-03-04
Payer: MEDICARE

## 2024-03-04 VITALS
DIASTOLIC BLOOD PRESSURE: 78 MMHG | BODY MASS INDEX: 41.1 KG/M2 | SYSTOLIC BLOOD PRESSURE: 144 MMHG | HEART RATE: 87 BPM | WEIGHT: 232 LBS

## 2024-03-04 DIAGNOSIS — E01.0 THYROMEGALY: ICD-10-CM

## 2024-03-04 DIAGNOSIS — E04.9 SUBSTERNAL THYROID GOITER: Primary | ICD-10-CM

## 2024-03-04 PROCEDURE — 3078F DIAST BP <80 MM HG: CPT | Performed by: SURGERY

## 2024-03-04 PROCEDURE — 1036F TOBACCO NON-USER: CPT | Performed by: SURGERY

## 2024-03-04 PROCEDURE — 1159F MED LIST DOCD IN RCRD: CPT | Performed by: SURGERY

## 2024-03-04 PROCEDURE — 1160F RVW MEDS BY RX/DR IN RCRD: CPT | Performed by: SURGERY

## 2024-03-04 PROCEDURE — 3077F SYST BP >= 140 MM HG: CPT | Performed by: SURGERY

## 2024-03-04 PROCEDURE — 1126F AMNT PAIN NOTED NONE PRSNT: CPT | Performed by: SURGERY

## 2024-03-04 PROCEDURE — 99205 OFFICE O/P NEW HI 60 MIN: CPT | Performed by: SURGERY

## 2024-03-04 ASSESSMENT — ENCOUNTER SYMPTOMS
PSYCHIATRIC NEGATIVE: 1
GASTROINTESTINAL NEGATIVE: 1
SHORTNESS OF BREATH: 1
MUSCULOSKELETAL NEGATIVE: 1
NEUROLOGICAL NEGATIVE: 1
FATIGUE: 1
ENDOCRINE NEGATIVE: 1
CARDIOVASCULAR NEGATIVE: 1

## 2024-03-04 NOTE — PROGRESS NOTES
Subjective   Patient ID: Brandi Duenas is a 73 y.o. female who presents for surgical consultation for a compressive substernal goiter.    HPI I saw Mrs. Duenas in surgery clinic today.  She was seen in the emergency department about 2 weeks ago for some dental pain and facial swelling issues.  She was seen at the dentist and given some nonsteroidal anti-inflammatories.  They also put her on some doxycycline.  Things were not improving so she went to the emergency department.  In the ER she was hemodynamically stable.  Given her complaints they ordered a CT scan of her neck.  There were some nonspecific findings around the mouth and teeth but no evidence of any large retropharyngeal abscess or other infectious etiologies.    However she did have a large thyroid goiter with substernal extension on both the left and right sides.  I personally reviewed the images.  It is causing some mild tracheal deviation from left to right as well as some mild tracheal compression.    She does have a history of hypothyroidism and takes levothyroxine 75 mcg daily.  Her last TSH was normal at 0.55 and her free T4 was just barely above normal at 1.14.    She states that she has known about her thyroid goiter dating back at least 5+ years.  She had an ultrasound done at the [facility].  She brought a copy with her today.  It shows that her thyroid is roughly about the same size as it was in 2019.  Minimal change.  She denies ever having a biopsy done on her thyroid.    She denies any neck pain.  No difficulty swallowing.  She has been having a lot of respiratory issues.  She has had multiple test done and is meeting with her pulmonologist tomorrow.  She said they did pulmonary function testing a VQ scan which was read as negative chest CT as well.  This also shows the thyroid goiter.  All of those tests were done at the [facility].    She denies any history of head neck or chest radiation exposure.    Family history no  thyroid disorders no thyroid cancer.      Review of Systems   Constitutional:  Positive for fatigue.   HENT:  Positive for congestion.    Respiratory:  Positive for shortness of breath.    Cardiovascular: Negative.    Gastrointestinal: Negative.    Endocrine: Negative.    Musculoskeletal: Negative.    Neurological: Negative.    Psychiatric/Behavioral: Negative.         Objective   Physical Exam  Vitals reviewed.   Constitutional:       Appearance: Normal appearance. She is obese.   Eyes:      Comments: No proptosis   Neck:      Vascular: No carotid bruit.      Comments: Enlarged thyroid gland.  Left greater than right thyroid lobe.  Trachea remains midline.  I am not able to feel the inferior extent of her thyroid on swallowing which is consistent with her diagnosis of substernal goiter.  No discrete nodules.  Cardiovascular:      Rate and Rhythm: Normal rate and regular rhythm.      Pulses: Normal pulses.      Heart sounds: Normal heart sounds. No murmur heard.     No gallop.   Pulmonary:      Effort: No respiratory distress.      Breath sounds: No wheezing or rales.      Comments: Negative [place]  Musculoskeletal:         General: Normal range of motion.   Skin:     General: Skin is warm and dry.   Neurological:      General: No focal deficit present.      Mental Status: She is alert and oriented to person, place, and time.      Gait: Gait abnormal.      Comments: She walks with a cane for support but was able to get up onto the exam table by herself.   Psychiatric:         Mood and Affect: Mood normal.         Behavior: Behavior normal.         CT SOFT TISSUE NECK W IV CONTRAST;  2/14/2024 6:33 pm      INDICATION:  Mouth pain for 1 week.      COMPARISON:  None.      ACCESSION NUMBER(S):  [id number]      ORDERING CLINICIAN:  CHANEL ANAYA      TECHNIQUE:  Axial CT images of the neck were obtained.  The patient received  intravenous contrast agent. The images were reformatted in angled  axial, coronal and  sagittal planes.      FINDINGS:  Oral Cavity, Pharynx and Larynx:  Evaluation oral cavity is limited  by streak artifact from dental hardware. Within these limitations,  several teeth are missing. There is a periapical lucency involving  right-sided mandibular 1st molar tooth.  There is mild nonspecific  soft tissue thickening involving the sublingual soft tissues  particularly along the dorsal aspect of the mentum. This is  nonspecific and could be within the range of normal variation,  however underlying abnormality can not be excluded. There is no fluid  collection or abnormal enhancement in the floor of mouth region. The  hypopharyngeal and laryngeal structures are unremarkable.      There is nonspecific soft tissue thickening overlying the mentum of  the mandible with mild fat stranding. No definite fluid collection is  identified in this region. There is no periapical lucency associated  with the adjacent teeth. This may be within the range of normal  variation, however underlying infectious or inflammatory soft tissue  thickening can not be excluded.      Retropharyngeal and Prevertebral Soft Tissues: Unremarkable.      Lymph nodes: There are few non specific bilateral neck nodes,  probably reactive in etiology.      Neck vessels: Bilateral neck vessels are normal in course and caliber  and appear patent.      Thyroid gland: The thyroid gland is significantly enlarged with  heterogenous attenuation. It causes mild mass effect with narrowing  of the trachea and mild mass effect on the esophagus. There is  retrosternal extension of the thyroid gland in the upper mediastinum.  The right thyroid lobe measures 9.3 cm in maximum craniocaudal  dimension and 4.2 cm in maximum AP dimension. The left thyroid lobe  measures 7.7 cm in craniocaudal dimension and 4.2 cm in AP dimension.  There are coarse calcifications within bilateral thyroid lobes.      Parotid and submandibular glands: Bilateral parotid and  submandibular  glands are unremarkable in appearance.      Paranasal Sinuses and Mastoids: Visualized paranasal sinuses and  bilateral mastoids are clear.      Visualized orbital structures are unremarkable.      Visualized upper lungs are clear.      There are multilevel degenerative changes of the cervical spine.      IMPRESSION:  Nonspecific soft tissue thickening along the anterior and posterior  aspect of the mentum of mandible including the upper floor of mouth  region. This could be within the range of normal variation, however  an underlying infectious or inflammatory soft tissue process can not  be excluded. There is no lytic lesion of the mandible or associated  periapical lucency in this region. There is no rim enhancing fluid  collection to suggest an abscess.      Significantly enlarged thyroid gland with heterogenous attenuation  and mediastinal extension. There is mild mass effect on trachea and  esophagus.    Assessment/Plan    Mrs. Duenas has a known history of enlarged thyroid goiter.  She said that she has been hypothyroid on thyroid hormone replacement for about 20 years now.  She had an ultrasound of her thyroid done at the [facility] in 2019.  When comparing the measurements they are compared to her CT scan now her thyroid is roughly about the same size.    On her CT scan she does have substernal extension of her thyroid gland.  Left lobe greater than right lobe in terms of depth of extension into the chest.  This is causing some mild tracheal deviation from left to right and some mild tracheal compression.  I would doubt that this would be enough to be causing her respiratory symptoms.    She is meeting with the pulmonologist at the [facility] tomorrow after having undergone pulmonary function testing and chest CT and a VQ scan.  I gave her one of my office cards to take with her to that pulmonary appointment.  That way hopefully her pulmonologist can contact us and give us an  update on the outcome of all of her testing.  Typically a flow-volume loop on the PFTs can help us to determine if the thyroid is causing any external tracheal compression.  The patient had a negative [place] sign on examination today.    Ultimately, she still may benefit from total thyroidectomy with cervical resection of her substernal goiter.  Although at this time she is relatively asymptomatic other than her respiratory complaints which I am not convinced are directly related to her thyroid.  I will await word back from her pulmonologist.    Walter Perez MD 03/04/24 1:00 PM

## 2024-08-14 DIAGNOSIS — E04.9 GOITER: ICD-10-CM

## 2024-08-14 DIAGNOSIS — R23.3 EASY BRUISING: ICD-10-CM

## 2024-08-14 DIAGNOSIS — Z01.818 PREOPERATIVE TESTING: ICD-10-CM

## 2024-08-14 DIAGNOSIS — Z00.00 HEALTHCARE MAINTENANCE: ICD-10-CM

## 2024-08-18 ENCOUNTER — PREP FOR PROCEDURE (OUTPATIENT)
Dept: SURGICAL ONCOLOGY | Facility: HOSPITAL | Age: 74
End: 2024-08-18
Payer: MEDICARE

## 2024-08-18 DIAGNOSIS — E04.9 SUBSTERNAL THYROID GOITER: Primary | ICD-10-CM

## 2024-08-18 RX ORDER — SODIUM CHLORIDE, SODIUM LACTATE, POTASSIUM CHLORIDE, CALCIUM CHLORIDE 600; 310; 30; 20 MG/100ML; MG/100ML; MG/100ML; MG/100ML
20 INJECTION, SOLUTION INTRAVENOUS CONTINUOUS
OUTPATIENT
Start: 2024-08-18

## 2024-08-18 NOTE — H&P (VIEW-ONLY)
Subjective  Patient ID: Brandi Duenas is a 73 y.o. female who presents for surgical consultation for a compressive substernal goiter.     HPI I saw Mrs. Duenas in surgery clinic today.  She was seen in the emergency department about 2 weeks ago for some dental pain and facial swelling issues.  She was seen at the dentist and given some nonsteroidal anti-inflammatories.  They also put her on some doxycycline.  Things were not improving so she went to the emergency department.  In the ER she was hemodynamically stable.  Given her complaints they ordered a CT scan of her neck.  There were some nonspecific findings around the mouth and teeth but no evidence of any large retropharyngeal abscess or other infectious etiologies.     However she did have a large thyroid goiter with substernal extension on both the left and right sides.  I personally reviewed the images.  It is causing some mild tracheal deviation from left to right as well as some mild tracheal compression.     She does have a history of hypothyroidism and takes levothyroxine 75 mcg daily.  Her last TSH was normal at 0.55 and her free T4 was just barely above normal at 1.14.     She states that she has known about her thyroid goiter dating back at least 5+ years.  She had an ultrasound done at the [facility].  She brought a copy with her today.  It shows that her thyroid is roughly about the same size as it was in 2019.  Minimal change.  She denies ever having a biopsy done on her thyroid.     She denies any neck pain.  No difficulty swallowing.  She has been having a lot of respiratory issues.  She has had multiple test done and is meeting with her pulmonologist tomorrow.  She said they did pulmonary function testing a VQ scan which was read as negative chest CT as well.  This also shows the thyroid goiter.  All of those tests were done at the [facility].     She denies any history of head neck or chest radiation exposure.     Family  history no thyroid disorders no thyroid cancer.        Review of Systems   Constitutional:  Positive for fatigue.   HENT:  Positive for congestion.    Respiratory:  Positive for shortness of breath.    Cardiovascular: Negative.    Gastrointestinal: Negative.    Endocrine: Negative.    Musculoskeletal: Negative.    Neurological: Negative.    Psychiatric/Behavioral: Negative.                 Objective  Physical Exam  Vitals reviewed.   Constitutional:       Appearance: Normal appearance. She is obese.   Eyes:      Comments: No proptosis   Neck:      Vascular: No carotid bruit.      Comments: Enlarged thyroid gland.  Left greater than right thyroid lobe.  Trachea remains midline.  I am not able to feel the inferior extent of her thyroid on swallowing which is consistent with her diagnosis of substernal goiter.  No discrete nodules.  Cardiovascular:      Rate and Rhythm: Normal rate and regular rhythm.      Pulses: Normal pulses.      Heart sounds: Normal heart sounds. No murmur heard.     No gallop.   Pulmonary:      Effort: No respiratory distress.      Breath sounds: No wheezing or rales.      Comments: Negative [place]  Musculoskeletal:         General: Normal range of motion.   Skin:     General: Skin is warm and dry.   Neurological:      General: No focal deficit present.      Mental Status: She is alert and oriented to person, place, and time.      Gait: Gait abnormal.      Comments: She walks with a cane for support but was able to get up onto the exam table by herself.   Psychiatric:         Mood and Affect: Mood normal.         Behavior: Behavior normal.            CT SOFT TISSUE NECK W IV CONTRAST;  2/14/2024 6:33 pm      INDICATION:  Mouth pain for 1 week.      COMPARISON:  None.      ACCESSION NUMBER(S):  [id number]      ORDERING CLINICIAN:  CHANEL ANAYA      TECHNIQUE:  Axial CT images of the neck were obtained.  The patient received  intravenous contrast agent. The images were reformatted in  angled  axial, coronal and sagittal planes.      FINDINGS:  Oral Cavity, Pharynx and Larynx:  Evaluation oral cavity is limited  by streak artifact from dental hardware. Within these limitations,  several teeth are missing. There is a periapical lucency involving  right-sided mandibular 1st molar tooth.  There is mild nonspecific  soft tissue thickening involving the sublingual soft tissues  particularly along the dorsal aspect of the mentum. This is  nonspecific and could be within the range of normal variation,  however underlying abnormality can not be excluded. There is no fluid  collection or abnormal enhancement in the floor of mouth region. The  hypopharyngeal and laryngeal structures are unremarkable.      There is nonspecific soft tissue thickening overlying the mentum of  the mandible with mild fat stranding. No definite fluid collection is  identified in this region. There is no periapical lucency associated  with the adjacent teeth. This may be within the range of normal  variation, however underlying infectious or inflammatory soft tissue  thickening can not be excluded.      Retropharyngeal and Prevertebral Soft Tissues: Unremarkable.      Lymph nodes: There are few non specific bilateral neck nodes,  probably reactive in etiology.      Neck vessels: Bilateral neck vessels are normal in course and caliber  and appear patent.      Thyroid gland: The thyroid gland is significantly enlarged with  heterogenous attenuation. It causes mild mass effect with narrowing  of the trachea and mild mass effect on the esophagus. There is  retrosternal extension of the thyroid gland in the upper mediastinum.  The right thyroid lobe measures 9.3 cm in maximum craniocaudal  dimension and 4.2 cm in maximum AP dimension. The left thyroid lobe  measures 7.7 cm in craniocaudal dimension and 4.2 cm in AP dimension.  There are coarse calcifications within bilateral thyroid lobes.      Parotid and submandibular glands:  Bilateral parotid and submandibular  glands are unremarkable in appearance.      Paranasal Sinuses and Mastoids: Visualized paranasal sinuses and  bilateral mastoids are clear.      Visualized orbital structures are unremarkable.      Visualized upper lungs are clear.      There are multilevel degenerative changes of the cervical spine.      IMPRESSION:  Nonspecific soft tissue thickening along the anterior and posterior  aspect of the mentum of mandible including the upper floor of mouth  region. This could be within the range of normal variation, however  an underlying infectious or inflammatory soft tissue process can not  be excluded. There is no lytic lesion of the mandible or associated  periapical lucency in this region. There is no rim enhancing fluid  collection to suggest an abscess.      Significantly enlarged thyroid gland with heterogenous attenuation  and mediastinal extension. There is mild mass effect on trachea and  esophagus.           Assessment/Plan  Mrs. Duenas has a known history of enlarged thyroid goiter.  She said that she has been hypothyroid on thyroid hormone replacement for about 20 years now.  She had an ultrasound of her thyroid done at the [facility] in 2019.  When comparing the measurements they are compared to her CT scan now her thyroid is roughly about the same size.     On her CT scan she does have substernal extension of her thyroid gland.  Left lobe greater than right lobe in terms of depth of extension into the chest.  This is causing some mild tracheal deviation from left to right and some mild tracheal compression.  I would doubt that this would be enough to be causing her respiratory symptoms.     She is meeting with the pulmonologist at the [facility] tomorrow after having undergone pulmonary function testing and chest CT and a VQ scan.  I gave her one of my office cards to take with her to that pulmonary appointment.  That way hopefully her pulmonologist can  contact us and give us an update on the outcome of all of her testing.  Typically a flow-volume loop on the PFTs can help us to determine if the thyroid is causing any external tracheal compression.  The patient had a negative [place] sign on examination today.     I am planning cervical resection of her substernal goiter at [facility] [place] on Wed 9/4/24.  She will need preop PAT exam with anesthesia.

## 2024-08-18 NOTE — H&P
Subjective  Patient ID: Brandi Duenas is a 73 y.o. female who presents for surgical consultation for a compressive substernal goiter.     HPI I saw Mrs. Duenas in surgery clinic today.  She was seen in the emergency department about 2 weeks ago for some dental pain and facial swelling issues.  She was seen at the dentist and given some nonsteroidal anti-inflammatories.  They also put her on some doxycycline.  Things were not improving so she went to the emergency department.  In the ER she was hemodynamically stable.  Given her complaints they ordered a CT scan of her neck.  There were some nonspecific findings around the mouth and teeth but no evidence of any large retropharyngeal abscess or other infectious etiologies.     However she did have a large thyroid goiter with substernal extension on both the left and right sides.  I personally reviewed the images.  It is causing some mild tracheal deviation from left to right as well as some mild tracheal compression.     She does have a history of hypothyroidism and takes levothyroxine 75 mcg daily.  Her last TSH was normal at 0.55 and her free T4 was just barely above normal at 1.14.     She states that she has known about her thyroid goiter dating back at least 5+ years.  She had an ultrasound done at the [facility].  She brought a copy with her today.  It shows that her thyroid is roughly about the same size as it was in 2019.  Minimal change.  She denies ever having a biopsy done on her thyroid.     She denies any neck pain.  No difficulty swallowing.  She has been having a lot of respiratory issues.  She has had multiple test done and is meeting with her pulmonologist tomorrow.  She said they did pulmonary function testing a VQ scan which was read as negative chest CT as well.  This also shows the thyroid goiter.  All of those tests were done at the [facility].     She denies any history of head neck or chest radiation exposure.     Family  history no thyroid disorders no thyroid cancer.        Review of Systems   Constitutional:  Positive for fatigue.   HENT:  Positive for congestion.    Respiratory:  Positive for shortness of breath.    Cardiovascular: Negative.    Gastrointestinal: Negative.    Endocrine: Negative.    Musculoskeletal: Negative.    Neurological: Negative.    Psychiatric/Behavioral: Negative.                 Objective  Physical Exam  Vitals reviewed.   Constitutional:       Appearance: Normal appearance. She is obese.   Eyes:      Comments: No proptosis   Neck:      Vascular: No carotid bruit.      Comments: Enlarged thyroid gland.  Left greater than right thyroid lobe.  Trachea remains midline.  I am not able to feel the inferior extent of her thyroid on swallowing which is consistent with her diagnosis of substernal goiter.  No discrete nodules.  Cardiovascular:      Rate and Rhythm: Normal rate and regular rhythm.      Pulses: Normal pulses.      Heart sounds: Normal heart sounds. No murmur heard.     No gallop.   Pulmonary:      Effort: No respiratory distress.      Breath sounds: No wheezing or rales.      Comments: Negative Rule  Musculoskeletal:         General: Normal range of motion.   Skin:     General: Skin is warm and dry.   Neurological:      General: No focal deficit present.      Mental Status: She is alert and oriented to person, place, and time.      Gait: Gait abnormal.      Comments: She walks with a cane for support but was able to get up onto the exam table by herself.   Psychiatric:         Mood and Affect: Mood normal.         Behavior: Behavior normal.            CT SOFT TISSUE NECK W IV CONTRAST;  2/14/2024 6:33 pm      INDICATION:  Mouth pain for 1 week.      COMPARISON:  None.      ACCESSION NUMBER(S):  [id number]      ORDERING CLINICIAN:  CHANEL ANAYA      TECHNIQUE:  Axial CT images of the neck were obtained.  The patient received  intravenous contrast agent. The images were reformatted in  angled  axial, coronal and sagittal planes.      FINDINGS:  Oral Cavity, Pharynx and Larynx:  Evaluation oral cavity is limited  by streak artifact from dental hardware. Within these limitations,  several teeth are missing. There is a periapical lucency involving  right-sided mandibular 1st molar tooth.  There is mild nonspecific  soft tissue thickening involving the sublingual soft tissues  particularly along the dorsal aspect of the mentum. This is  nonspecific and could be within the range of normal variation,  however underlying abnormality can not be excluded. There is no fluid  collection or abnormal enhancement in the floor of mouth region. The  hypopharyngeal and laryngeal structures are unremarkable.      There is nonspecific soft tissue thickening overlying the mentum of  the mandible with mild fat stranding. No definite fluid collection is  identified in this region. There is no periapical lucency associated  with the adjacent teeth. This may be within the range of normal  variation, however underlying infectious or inflammatory soft tissue  thickening can not be excluded.      Retropharyngeal and Prevertebral Soft Tissues: Unremarkable.      Lymph nodes: There are few non specific bilateral neck nodes,  probably reactive in etiology.      Neck vessels: Bilateral neck vessels are normal in course and caliber  and appear patent.      Thyroid gland: The thyroid gland is significantly enlarged with  heterogenous attenuation. It causes mild mass effect with narrowing  of the trachea and mild mass effect on the esophagus. There is  retrosternal extension of the thyroid gland in the upper mediastinum.  The right thyroid lobe measures 9.3 cm in maximum craniocaudal  dimension and 4.2 cm in maximum AP dimension. The left thyroid lobe  measures 7.7 cm in craniocaudal dimension and 4.2 cm in AP dimension.  There are coarse calcifications within bilateral thyroid lobes.      Parotid and submandibular glands:  Bilateral parotid and submandibular  glands are unremarkable in appearance.      Paranasal Sinuses and Mastoids: Visualized paranasal sinuses and  bilateral mastoids are clear.      Visualized orbital structures are unremarkable.      Visualized upper lungs are clear.      There are multilevel degenerative changes of the cervical spine.      IMPRESSION:  Nonspecific soft tissue thickening along the anterior and posterior  aspect of the mentum of mandible including the upper floor of mouth  region. This could be within the range of normal variation, however  an underlying infectious or inflammatory soft tissue process can not  be excluded. There is no lytic lesion of the mandible or associated  periapical lucency in this region. There is no rim enhancing fluid  collection to suggest an abscess.      Significantly enlarged thyroid gland with heterogenous attenuation  and mediastinal extension. There is mild mass effect on trachea and  esophagus.           Assessment/Plan  Mrs. Duenas has a known history of enlarged thyroid goiter.  She said that she has been hypothyroid on thyroid hormone replacement for about 20 years now.  She had an ultrasound of her thyroid done at the [facility] in 2019.  When comparing the measurements they are compared to her CT scan now her thyroid is roughly about the same size.     On her CT scan she does have substernal extension of her thyroid gland.  Left lobe greater than right lobe in terms of depth of extension into the chest.  This is causing some mild tracheal deviation from left to right and some mild tracheal compression.  I would doubt that this would be enough to be causing her respiratory symptoms.     She is meeting with the pulmonologist at the [facility] tomorrow after having undergone pulmonary function testing and chest CT and a VQ scan.  I gave her one of my office cards to take with her to that pulmonary appointment.  That way hopefully her pulmonologist can  contact us and give us an update on the outcome of all of her testing.  Typically a flow-volume loop on the PFTs can help us to determine if the thyroid is causing any external tracheal compression.  The patient had a negative Catlin sign on examination today.     I am planning cervical resection of her substernal goiter at [facility] on Wed 9/4/24.  She will need preop PAT exam with anesthesia.

## 2024-08-21 ENCOUNTER — TELEPHONE (OUTPATIENT)
Dept: SURGERY | Facility: CLINIC | Age: 74
End: 2024-08-21
Payer: MEDICARE

## 2024-08-21 NOTE — TELEPHONE ENCOUNTER
Called patient to confirm OR date 9/4/24. Patient confirms she has PAT scheduled.  Patient would like to receive pre-op instructions via [employer]PS and address confirmed. (Copy also emailed to address on file in case mail doesn't arrive in a timely manner. All questions addressed. Callback number provided.

## 2024-08-27 ENCOUNTER — CLINICAL SUPPORT (OUTPATIENT)
Dept: PREADMISSION TESTING | Facility: HOSPITAL | Age: 74
End: 2024-08-27
Payer: MEDICARE

## 2024-08-27 RX ORDER — ASCORBIC ACID 500 MG
500 TABLET ORAL 2 TIMES DAILY
COMMUNITY

## 2024-08-27 NOTE — CPM/PAT NURSE NOTE
CPM/PAT Nurse Note      Name: Brandi Duenas (Brandi Duenas)  /Age: 1950/74 y.o.     Brandi Duenas is scheduled for Thyroidectomy - Bilateral  on 24  Past Medical History:   Diagnosis Date    Hyperlipidemia     Hypertension     Hypothyroidism        No past surgical history on file.    Patient  has no history on file for sexual activity.    No family history on file.    Allergies   Allergen Reactions    Penicillin Hives       Prior to Admission medications    Medication Sig Start Date End Date Taking? Authorizing Provider   clindamycin (Cleocin) 300 mg capsule Take 1 capsule (300 mg) by mouth every 6 hours. 2/15/24   Kendrick Marcus MD   levothyroxine (Synthroid, Levoxyl) 75 mcg tablet Take 1 tablet (75 mcg) by mouth once daily. 23   Historical Provider, MD   lisinopril 40 mg tablet Take 1 tablet (40 mg) by mouth once daily. 23   Historical Provider, MD   metoprolol tartrate (Lopressor) 50 mg tablet Take 1 half tablet by mouth 2 times a day. 23   Historical Provider, MD   naproxen (Naprosyn) 500 mg tablet once daily. 23   Historical Provider, MD   oxyCODONE (Roxicodone) 5 mg immediate release tablet Take 1 tablet (5 mg) by mouth every 6 hours if needed for moderate pain (4 - 6). 2/15/24   Kendrick Marcus MD   rosuvastatin (Crestor) 5 mg tablet Take 1 tablet (5 mg) by mouth once daily. 23   Historical Provider, MD MCKINLEY ROS     DASI Risk Score    No data to display       Caprini DVT Assessment    No data to display       Modified Frailty Index    No data to display       CHADS2 Stroke Risk  Current as of today        N/A 3 to 100%: High Risk   2 to < 3%: Medium Risk   0 to < 2%: Low Risk     Last Change: N/A          This score determines the patient's risk of having a stroke if the patient has atrial fibrillation.        This score is not applicable to this patient. Components are not calculated.          Revised Cardiac Risk Index    No data to display       Apfel  Simplified Score    No data to display       Risk Analysis Index Results This Encounter    No data found in the last 1 encounters.     Providers:                                                                                                           Cardiology: Loi Luevano at [facility] LOV 2024 FUV for Dilated pulmonary artery noted on CT scan 2023    PCP: Anita Dodd CNP ( Dr. Petersen) at [facility], 24 Follow up visit. PMH of GERD, Hypothyroid, HTN     Pulmonology: Shey Ceja at [facility], 24 Evaluation of pulmonary hypertension, Abnormal CT of the chest and prolonged pneumonia. 24 Bronchoscopy results were normal    Per note:  -Suspect she had viral URI which progressed into superimposed bacterial pneumonia with stenotrophomonas with extensive time to recovery  -Counseled to get flu and RSV vaccination this fall      Follow up with pulmonary PRN     Surgical Onc: Walter Barkerhelm 24 presents for surgical consultation for a compressive substernal goiter.     Endocrinolgy: Eloise Martinez at [facility], 24 Evaluation of thyromegaly. Patient presents as a new consultation from Aline Sutherland APRN.CNP.       2024: Hospatilized, discharged diagnosis Dental infection  Submandibular swelling, Thyromegaly      --TESTING:      - EK24  Normal sinus rhythm  Left axis deviation  Minimal voltage criteria for LVH, may be normal variant ( R in aVL ) Septal infarct , age undetermined  T wave abnormality, consider anterior ischemia  Abnormal ECG  When compared with ECG of 2024 08:35,  Septal infarct is now Present  T wave inversion now evident in Anterior leads    - Echo: 24  CONCLUSIONS:   - Exam indication: PHTN   - The left ventricle is normal in size. Left ventricular systolic function is   normal. EF = 56 ± 5% (2D biplane) Normal left ventricular diastolic function.   - The right ventricle is normal in size. Right ventricular systolic function is   normal.   - The left atrial cavity is  mildly dilated.   - There are no significant valvular abnormalities.   - Estimated right ventricular systolic pressure is 25 mmHg consistent with normal   pulmonary artery pressures. Estimated right atrial pressure is 8 mmHg based on IVC    assessment.   - Exam was compared with the prior  echocardiographic exam performed on   10/3/2023. There has been no significant interval change.        - PFTs: 24 at [facility], Care Everywhere  no evidence of obstruction, no restriction, no diffusion impairment, normal PFTS     - TSH: 0.604 on 24    - CT Chest: 24  IMPRESSION:   1. There has been no change compared to the prior study, scattered   peribronchovascular groundglass opacities predominantly in the right   upper lobe show no change and remain nonspecific with possibilities   including infection or inflammatory process.  Unchanged lingular   atelectasis.     2. Mild air trapping, suggestive of small airway disease.     - CT Calcium Scorin23  Impression   1. Coronary artery calcium score of 0*.  2. Slightly dilated main pulmonary artery. Correlation with pulmonary  artery hypertension.  3. Small hiatal hernia.         ________________________________________________________  Medication instructions:   Instructed to hold Vitamins, Supplements and Ibuprofen 7 days prior to surgery       Farhana Puga LPN  Preadmission Testing        Nurse Plan of Action:   Cardiac recommendation requested

## 2024-08-30 ENCOUNTER — PRE-ADMISSION TESTING (OUTPATIENT)
Dept: PREADMISSION TESTING | Facility: HOSPITAL | Age: 74
End: 2024-08-30
Payer: MEDICARE

## 2024-08-30 VITALS
DIASTOLIC BLOOD PRESSURE: 78 MMHG | TEMPERATURE: 96.7 F | SYSTOLIC BLOOD PRESSURE: 144 MMHG | WEIGHT: 233.6 LBS | OXYGEN SATURATION: 95 % | HEIGHT: 63 IN | BODY MASS INDEX: 41.39 KG/M2 | HEART RATE: 58 BPM

## 2024-08-30 DIAGNOSIS — Z01.818 PREOPERATIVE TESTING: ICD-10-CM

## 2024-08-30 DIAGNOSIS — R23.3 EASY BRUISING: ICD-10-CM

## 2024-08-30 DIAGNOSIS — Z00.00 HEALTHCARE MAINTENANCE: ICD-10-CM

## 2024-08-30 DIAGNOSIS — E04.9 SUBSTERNAL THYROID GOITER: ICD-10-CM

## 2024-08-30 DIAGNOSIS — E04.9 GOITER: ICD-10-CM

## 2024-08-30 LAB
ABO GROUP (TYPE) IN BLOOD: NORMAL
ALBUMIN SERPL BCP-MCNC: 4.3 G/DL (ref 3.4–5)
ALP SERPL-CCNC: 79 U/L (ref 33–136)
ALT SERPL W P-5'-P-CCNC: 20 U/L (ref 7–45)
ANION GAP SERPL CALC-SCNC: 13 MMOL/L (ref 10–20)
ANTIBODY SCREEN: NORMAL
APTT PPP: 31 SECONDS (ref 27–38)
AST SERPL W P-5'-P-CCNC: 19 U/L (ref 9–39)
BILIRUB SERPL-MCNC: 0.7 MG/DL (ref 0–1.2)
BUN SERPL-MCNC: 19 MG/DL (ref 6–23)
CALCIUM SERPL-MCNC: 10 MG/DL (ref 8.6–10.6)
CHLORIDE SERPL-SCNC: 104 MMOL/L (ref 98–107)
CO2 SERPL-SCNC: 28 MMOL/L (ref 21–32)
CREAT SERPL-MCNC: 0.97 MG/DL (ref 0.5–1.05)
EGFRCR SERPLBLD CKD-EPI 2021: 61 ML/MIN/1.73M*2
ERYTHROCYTE [DISTWIDTH] IN BLOOD BY AUTOMATED COUNT: 13.2 % (ref 11.5–14.5)
GLUCOSE SERPL-MCNC: 102 MG/DL (ref 74–99)
HCT VFR BLD AUTO: 48.1 % (ref 36–46)
HGB BLD-MCNC: 15.3 G/DL (ref 12–16)
INR PPP: 1 (ref 0.9–1.1)
MCH RBC QN AUTO: 27.8 PG (ref 26–34)
MCHC RBC AUTO-ENTMCNC: 31.8 G/DL (ref 32–36)
MCV RBC AUTO: 88 FL (ref 80–100)
NRBC BLD-RTO: 0 /100 WBCS (ref 0–0)
PLATELET # BLD AUTO: 240 X10*3/UL (ref 150–450)
POTASSIUM SERPL-SCNC: 5.4 MMOL/L (ref 3.5–5.3)
PROT SERPL-MCNC: 7.6 G/DL (ref 6.4–8.2)
PROTHROMBIN TIME: 10.9 SECONDS (ref 9.8–12.8)
RBC # BLD AUTO: 5.5 X10*6/UL (ref 4–5.2)
RH FACTOR (ANTIGEN D): NORMAL
SODIUM SERPL-SCNC: 140 MMOL/L (ref 136–145)
WBC # BLD AUTO: 9.5 X10*3/UL (ref 4.4–11.3)

## 2024-08-30 PROCEDURE — 85027 COMPLETE CBC AUTOMATED: CPT | Performed by: SURGERY

## 2024-08-30 PROCEDURE — 85610 PROTHROMBIN TIME: CPT | Performed by: SURGERY

## 2024-08-30 PROCEDURE — 86901 BLOOD TYPING SEROLOGIC RH(D): CPT | Performed by: NURSE PRACTITIONER

## 2024-08-30 PROCEDURE — 36415 COLL VENOUS BLD VENIPUNCTURE: CPT

## 2024-08-30 PROCEDURE — 99204 OFFICE O/P NEW MOD 45 MIN: CPT | Performed by: NURSE PRACTITIONER

## 2024-08-30 PROCEDURE — 85730 THROMBOPLASTIN TIME PARTIAL: CPT | Performed by: SURGERY

## 2024-08-30 PROCEDURE — 84075 ASSAY ALKALINE PHOSPHATASE: CPT | Performed by: SURGERY

## 2024-08-30 ASSESSMENT — DUKE ACTIVITY SCORE INDEX (DASI)
CAN YOU DO HEAVY WORK AROUND THE HOUSE LIKE SCRUBBING FLOORS OR LIFTING AND MOVING HEAVY FURNITURE: NO
CAN YOU DO MODERATE WORK AROUND THE HOUSE LIKE VACUUMING, SWEEPING FLOORS OR CARRYING GROCERIES: YES
CAN YOU WALK A BLOCK OR TWO ON LEVEL GROUND: NO
CAN YOU PARTICIPATE IN MODERATE RECREATIONAL ACTIVITIES LIKE GOLF, BOWLING, DANCING, DOUBLES TENNIS OR THROWING A BASEBALL OR FOOTBALL: NO
CAN YOU DO LIGHT WORK AROUND THE HOUSE LIKE DUSTING OR WASHING DISHES: YES
CAN YOU CLIMB A FLIGHT OF STAIRS OR WALK UP A HILL: YES
DASI METS SCORE: 5.4
TOTAL_SCORE: 21.45
CAN YOU PARTICIPATE IN STRENOUS SPORTS LIKE SWIMMING, SINGLES TENNIS, FOOTBALL, BASKETBALL, OR SKIING: NO
CAN YOU TAKE CARE OF YOURSELF (EAT, DRESS, BATHE, OR USE TOILET): YES
CAN YOU RUN A SHORT DISTANCE: NO
CAN YOU WALK INDOORS, SUCH AS AROUND YOUR HOUSE: YES
CAN YOU DO YARD WORK LIKE RAKING LEAVES, WEEDING OR PUSHING A MOWER: NO
CAN YOU HAVE SEXUAL RELATIONS: YES

## 2024-08-30 ASSESSMENT — LIFESTYLE VARIABLES: SMOKING_STATUS: NONSMOKER

## 2024-08-30 ASSESSMENT — ENCOUNTER SYMPTOMS
TROUBLE SWALLOWING: 1
MYALGIAS: 1
ARTHRALGIAS: 1

## 2024-08-30 ASSESSMENT — PAIN - FUNCTIONAL ASSESSMENT: PAIN_FUNCTIONAL_ASSESSMENT: 0-10

## 2024-08-30 NOTE — PREPROCEDURE INSTRUCTIONS
Fasting Guidelines    Why must I stop eating and drinking near surgery time?  With sedation, food or liquid in your stomach can enter your lungs causing serious complications  Increases nausea and vomiting    When do I need to stop eating and drinking before my surgery?  Do not eat any food or drink any liquids after midnight the night before your surgery/procedure.  You may have sips of water to take medications.    Additional Instructions:     Avoid herbal supplements, multivitamins and NSAIDS (non-steroidal anti-inflammatory drugs) such as Advil, Aleve, Ibuprofen, Naproxen, Excedrin, Meloxicam or Celebrex for at least 7 days prior to surgery. May take Tylenol as needed.    Avoid tobacco and alcohol products for 24 hours prior to surgery.    CONTACT SURGEON'S OFFICE IF YOU DEVELOP:  * Fever = 100.4 F   * New respiratory symptoms (e.g. cough, shortness of breath, respiratory distress, sore throat)  * Recent loss of taste or smell  *Flu like symptoms such as headache, fatigue or gastrointestinal symptoms  * You develop any open sores, shingles, burning or painful urination   AND/OR:  * You no longer wish to have the surgery.  * Any other personal circumstances change that may lead to the need to cancel or defer this surgery.  *You were admitted to any hospital within one week of your planned procedure.    Seven/Six Days before Surgery:  Review your medication instructions, stop indicated medications    Day of Surgery:  Review your medication instructions, take indicated medications  Wear comfortable loose fitting clothing  Do not use moisturizers, creams, lotions or perfume  All jewelry and valuables should be left at home    Andrés Schrader [facility]  [facility]  Dlvuf-[phone]  Fpa-[phone]  Email-[email or web address]      Preoperative Brain Exercises    What are brain exercises?  A brain exercise is any activity that engages your thinking (cognitive) skills.    What types  of activities are considered brain exercises?  Jigsaw puzzles, crossword puzzles, word jumble, memory games, word search, and many more.  Many can be found free online or on your phone via a mobile yaya.    Why should I do brain exercises before my surgery?  More recent research has shown brain exercise before surgery can lower the risk of postoperative delirium (confusion) which can be especially important for older adults.  Patients who did brain exercises for 5 to 10 hours the days before surgery, cut their risk of postoperative delirium in half up to 1 week after surgery.         [facility]    Preoperative Deep Breathing Exercises    Why it is important to do deep breathing exercises before my surgery?  Deep breathing exercises strengthen your breathing muscles.  This helps you to recover after your surgery and decreases the chance of breathing complications.      How are the deep breathing exercises done?  Sit straight with your back supported.  Breathe in deeply and slowly through your nose. Your lower rib cage should expand and your abdomen may move forward.  Hold that breath for 3 to 5 seconds.  Breathe out through pursed lips, slowly and completely.  Rest and repeat 10 times every hour while awake.  Rest longer if you become dizzy or lightheaded.         Patient and Family Education             Ways You Can Help Prevent Blood Clots             This handout explains some simple things you can do to help prevent blood clots.      Blood clots are blockages that can form in the body's veins. When a blood clot forms in your deep veins, it may be called a deep vein thrombosis, or DVT for short. Blood clots can happen in any part of the body where blood flows, but they are most common in the arms and legs. If a piece of a blood clot breaks free and travels to the lungs, it is called a pulmonary embolus (PE). A PE can be a very serious problem.         Being in the hospital or having  surgery can raise your chances of getting a blood clot because you may not be well enough to move around as much as you normally do.         Ways you can help prevent blood clots in the hospital         Wearing SCDs. SCDs stands for Sequential Compression Devices.   SCDs are special sleeves that wrap around your legs  They attach to a pump that fills them with air to gently squeeze your legs every few minutes.   This helps return the blood in your legs to your heart.   SCDs should only be taken off when walking or bathing.   SCDs may not be comfortable, but they can help save your life.               Wearing compression stockings - if your doctor orders them. These special snug fitting stockings gently squeeze your legs to help blood flow.       Walking. Walking helps move the blood in your legs.   If your doctor says it is ok, try walking the halls at least   5 times a day. Ask us to help you get up, so you don't fall.      Taking any blood thinning medicines your doctor orders.        Page 1 of 2     [facility]; 3/23   Ways you can help prevent blood clots at home       Wearing compression stockings - if your doctor orders them. ? Walking - to help move the blood in your legs.       Taking any blood thinning medicines your doctor orders.      Signs of a blood clot or PE      Tell your doctor or nurse know right away if you have of the problems listed below.    If you are at home, seek medical care right away. Call 911 for chest pain or problems breathing.               Signs of a blood clot (DVT) - such as pain,  swelling, redness or warmth in your arm or leg      Signs of a pulmonary embolism (PE) - such as chest     pain or feeling short of breath

## 2024-08-30 NOTE — CPM/PAT H&P
CPM/PAT Evaluation       Name: Brandi Duenas (Brandi Duenas)  /Age: 1950/74 y.o.     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 She currently notes some dysphagia. She denies fever, chills, n/v, abdominal pain, chest pain, or sob. She presents today for perioperative evaluation in anticipation of Thyroidectomy - Bilateral on 24 with Dr. Perez.    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    Family History   Problem Relation Name Age of Onset    Other (bladder cancer) Father      Heart failure Father      COPD Father         Allergies   Allergen Reactions    Penicillin Hives    Sulfa (Sulfonamide Antibiotics) Unknown       Prior to Admission medications    Medication Sig Start Date End Date Taking? Authorizing Provider   ascorbic acid (Vitamin C) 500 mg tablet Take 1 tablet (500 mg) by mouth 2 times a day.    Historical Provider, MD   clindamycin (Cleocin) 300 mg capsule Take 1 capsule (300 mg) by mouth every 6 hours. 2/15/24   Kendrick Marcus MD   levothyroxine (Synthroid, Levoxyl)  75 mcg tablet Take 1 tablet (75 mcg) by mouth once daily. 7/6/23   Historical Provider, MD   lisinopril 40 mg tablet Take 1 tablet (40 mg) by mouth once daily. 7/6/23   Historical Provider, MD   metoprolol tartrate (Lopressor) 50 mg tablet Take 1 half tablet by mouth 2 times a day. 7/6/23   Historical Provider, MD   naproxen (Naprosyn) 500 mg tablet once daily. 7/6/23   Historical Provider, MD   oxyCODONE (Roxicodone) 5 mg immediate release tablet Take 1 tablet (5 mg) by mouth every 6 hours if needed for moderate pain (4 - 6). 2/15/24   Kendrick Marcus MD   rosuvastatin (Crestor) 5 mg tablet Take 1 tablet (5 mg) by mouth once daily. 7/6/23   Historical Provider, MD MCKINLEY ROS:   Constitutional:   Neuro/Psych:   Eyes:    use of corrective lenses  Ears:   Nose:   Mouth:   Throat:    dysphagia  Neck:   Cardio:   Respiratory:   Endocrine:   GI:   :   Musculoskeletal:    arthralgias   myalgias  Hematologic:   Skin:      Physical Exam  Vitals reviewed.   Constitutional:       Appearance: Normal appearance. She is obese.   HENT:      Head: Normocephalic and atraumatic.      Nose: Nose normal.      Mouth/Throat:      Mouth: Mucous membranes are moist.      Pharynx: Oropharynx is clear.   Eyes:      Extraocular Movements: Extraocular movements intact.      Conjunctiva/sclera: Conjunctivae normal.      Pupils: Pupils are equal, round, and reactive to light.   Cardiovascular:      Rate and Rhythm: Normal rate and regular rhythm.      Pulses: Normal pulses.      Heart sounds: Normal heart sounds.   Pulmonary:      Effort: Pulmonary effort is normal.      Breath sounds: Normal breath sounds.   Musculoskeletal:         General: Normal range of motion.      Cervical back: Normal range of motion.   Skin:     General: Skin is warm and dry.   Neurological:      General: No focal deficit present.      Mental Status: She is alert and oriented to person, place, and time.      Gait: Gait abnormal.      Comments: Uses cane   "  Psychiatric:         Mood and Affect: Mood normal.         Behavior: Behavior normal.          PAT AIRWAY:   Airway:     Mallampati::  IV    TM distance::  >3 FB    Neck ROM::  Full   lower   partials      Visit Vitals  /78   Pulse 58   Temp 35.9 °C (96.7 °F) (Temporal)   Ht 1.6 m (5' 3\")   Wt 106 kg (233 lb 9.6 oz)   SpO2 95%   BMI 41.38 kg/m²   Smoking Status Never   BSA 2.17 m²          DASI Risk Score      Flowsheet Row Most Recent Value   DASI SCORE 21.45   METS Score (Will be calculated only when all the questions are answered) 5.4          Caprini DVT Assessment      Flowsheet Row Most Recent Value   DVT Score 11   Current Status Major surgery planned, lasting over 3 hours   Age Over 75 years   BMI 41-50 (Morbid obesity)          Modified Frailty Index      Flowsheet Row Most Recent Value   Modified Frailty Index Calculator .0909          CHADS2 Stroke Risk  Current as of 2 hours ago        N/A 3 to 100%: High Risk   2 to < 3%: Medium Risk   0 to < 2%: Low Risk     Last Change: N/A          This score determines the patient's risk of having a stroke if the patient has atrial fibrillation.        This score is not applicable to this patient. Components are not calculated.          Revised Cardiac Risk Index      Flowsheet Row Most Recent Value   Revised Cardiac Risk Calculator 0          Apfel Simplified Score      Flowsheet Row Most Recent Value   Apfel Simplified Score Calculator 3          Risk Analysis Index Results This Encounter    No data found in the last 1 encounters.       Stop Bang Score      Flowsheet Row Most Recent Value   Do you snore loudly? 0   Do you often feel tired or fatigued after your sleep? 0   Has anyone ever observed you stop breathing in your sleep? 0   Do you have or are you being treated for high blood pressure? 1   Recent BMI (Calculated) 43.5   Is BMI greater than 35 kg/m2? 1=Yes   Age older than 50 years old? 1=Yes   Is your neck circumference greater than 17 inches " (Male) or 16 inches (Female)? 1   Gender - Male 0=No   STOP-BANG Total Score 4          Recent Results (from the past 168 hour(s))   aPTT    Collection Time: 24  9:20 AM   Result Value Ref Range    aPTT 31 27 - 38 seconds   Protime-INR    Collection Time: 24  9:20 AM   Result Value Ref Range    Protime 10.9 9.8 - 12.8 seconds    INR 1.0 0.9 - 1.1   CBC    Collection Time: 24  9:20 AM   Result Value Ref Range    WBC 9.5 4.4 - 11.3 x10*3/uL    nRBC 0.0 0.0 - 0.0 /100 WBCs    RBC 5.50 (H) 4.00 - 5.20 x10*6/uL    Hemoglobin 15.3 12.0 - 16.0 g/dL    Hematocrit 48.1 (H) 36.0 - 46.0 %    MCV 88 80 - 100 fL    MCH 27.8 26.0 - 34.0 pg    MCHC 31.8 (L) 32.0 - 36.0 g/dL    RDW 13.2 11.5 - 14.5 %    Platelets 240 150 - 450 x10*3/uL   Comprehensive Metabolic Panel    Collection Time: 24  9:20 AM   Result Value Ref Range    Glucose 102 (H) 74 - 99 mg/dL    Sodium 140 136 - 145 mmol/L    Potassium 5.4 (H) 3.5 - 5.3 mmol/L    Chloride 104 98 - 107 mmol/L    Bicarbonate 28 21 - 32 mmol/L    Anion Gap 13 10 - 20 mmol/L    Urea Nitrogen 19 6 - 23 mg/dL    Creatinine 0.97 0.50 - 1.05 mg/dL    eGFR 61 >60 mL/min/1.73m*2    Calcium 10.0 8.6 - 10.6 mg/dL    Albumin 4.3 3.4 - 5.0 g/dL    Alkaline Phosphatase 79 33 - 136 U/L    Total Protein 7.6 6.4 - 8.2 g/dL    AST 19 9 - 39 U/L    Bilirubin, Total 0.7 0.0 - 1.2 mg/dL    ALT 20 7 - 45 U/L   Type And Screen    Collection Time: 24  9:29 AM   Result Value Ref Range    ABO TYPE A     Rh TYPE POS     ANTIBODY SCREEN NEG         Diagnostic Results    - EK24  Normal sinus rhythm  Left axis deviation  Minimal voltage criteria for LVH, may be normal variant ( R in aVL ) Septal infarct , age undetermined  T wave abnormality, consider anterior ischemia  Abnormal ECG  When compared with ECG of 2024 08:35,  Septal infarct is now Present  T wave inversion now evident in Anterior leads     - Echo: 24  CONCLUSIONS:   - Exam indication: PHTN   - The left  ventricle is normal in size. Left ventricular systolic function is   normal. EF = 56 ± 5% (2D biplane) Normal left ventricular diastolic function.   - The right ventricle is normal in size. Right ventricular systolic function is   normal.   - The left atrial cavity is mildly dilated.   - There are no significant valvular abnormalities.   - Estimated right ventricular systolic pressure is 25 mmHg consistent with normal pulmonary artery pressures. Estimated right atrial pressure is 8 mmHg based on IVC assessment.   - Exam was compared with the prior  echocardiographic exam performed on   10/3/2023. There has been no significant interval change.      - PFTs: 24 at [facility], Care Everywhere  no evidence of obstruction, no restriction, no diffusion impairment, normal PFTS      - TSH: 0.604 on 24     - CT Chest: 24  IMPRESSION:   1. There has been no change compared to the prior study, scattered   peribronchovascular groundglass opacities predominantly in the right   upper lobe show no change and remain nonspecific with possibilities   including infection or inflammatory process.  Unchanged lingular   atelectasis.     2. Mild air trapping, suggestive of small airway disease.      - CT Calcium Scorin23  Impression  1. Coronary artery calcium score of 0*.  2. Slightly dilated main pulmonary artery. Correlation with pulmonary  artery hypertension.  3. Small hiatal hernia.    Assessment and Plan:     Anesthesia:  The patient notes anesthesia complications in the past related to PONV.     Neuro:   The patient has diagnoses or significant findings on chart review or clinical presentation and evaluation significant for depression, anxiety, PTSD. No current medications. The patient is at increased risk for postoperative delirium secondary to age 65 or older, depression. The patient is at increased risk for perioperative stroke secondary to hypertension , hyperlipidemia, female gender, general anesthesia,  "operative time >2.5 hours. Handouts for preoperative brain exercises given to patient.    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 Scheduled for thyroidectomy on 9/4/24 with Dr. Perez. No documented or reported history of airway difficulty.     Cardiovascular  The patient has diagnoses or significant findings on chart review or clinical presentation and evaluation significant for hypertension managed on lisinopril-instructed to hold 24 hours prior to surgery, metoprolol-instructed to take as prescribed morning of surgery, hyperlipidemia-managed on rosuvastatin-continue as prescribed in the perioperative period.  She is scheduled for non-cardiac surgery associated with elevated risk. The patient has no major cardiac contraindications to non- cardiac surgery.  RCRI  The patient meets 0-1 RCRI criteria and therefore has a less than 1% risk of major adverse cardiac complications.  METS  The patient's functional capacity capacity is greater than 4 METS.  The patient has a 30-day risk for MACE of 0 predictors, 3.9% risk for cardiac death, nonfatal myocardial infarction, and nonfatal cardiac arrest.  YOHAN score which indicates a 0.1% risk of intraoperative or 30-day postoperative MACE (major adverse cardiac event).  Cardiology Evaluation  The patient is followed by Dr. Judd Luevano in cardiology. Per telephone encounter 8/28. \"If she has no symptoms, she can proceed from a cardiac standpoint\"  Cardiology: Loi Luevano at [facility] LOV 1/5/2024 FUV for Dilated pulmonary artery noted on CT scan 9/2023     Pulmonary   No significant findings on chart review or clinical presentation and evaluation. The patient is at increased risk of perioperative pulmonary complications secondary to neck surgery, advanced age greater than 60.  Pulmonology: Shey Ceja at [facility], 05/07/24 Evaluation of pulmonary hypertension, Abnormal CT of the chest and prolonged pneumonia. " 4/23/24 Bronchoscopy results were normal    Per note:  -Suspect she had viral URI which progressed into superimposed bacterial pneumonia with stenotrophomonas with extensive time to recovery  -Counseled to get flu and RSV vaccination this fall      Follow up with pulmonary PRN     The patient has a stop bang score of 4, which places patient at intermediate risk for having SOPHIE.    ARISCAT 26, Intermediate, 13.3% risk of in-hospital postoperative pulmonary complications  PRODIGY 12, intermediate risk of respiratory depression episode. Patient given PI sheet for preoperative deep breathing exercises.    Hematology  No diagnoses or significant findings on chart review or clinical presentation and evaluation.    Caprini score 11, high risk of perioperative VTE.     Patient instructed to ambulate as soon as possible postoperatively to decrease thromboembolic risk. Initiate mechanical DVT prophylaxis as soon as possible and initiate chemical prophylaxis when deemed safe from a bleeding standpoint post surgery.     Transfusion Evaluation  T&S obtained.     Gastrointestinal  No diagnoses or significant findings on chart review or clinical presentation and evaluation.    Eat 10- 18,  self-perceived oropharyngeal dysphagia scale (0-40)     Genitourinary  No diagnoses or significant findings on chart review or clinical presentation and evaluation.    Renal  The patient has history of stage 3 CKD. Renal function stable per last BMP. The patient has specific risk factors associated with increased risk of perioperative renal complications related to age greater than 55, hypertension. Preventative measures include preoperative hydration.    Musculoskeletal  The patient has diagnoses or significant findings on chart review or clinical presentation and evaluation significant for OA, chronic generalized pain. Avoid NSAIDS 7 days prior to surgery. Uses cane as needed.     Endocrine  The patient has diagnoses or significant findings on  chart review or clinical presentation and evaluation significant for hypothyroidism managed on Levothyroxine. TSH wnl 7/5/24.    Endocrinolgy: Eloise Martinez at [facility], 06/19/24 Evaluation of thyromegaly. Patient presents as a new consultation from Aline Sutherland APRN.CNP.     ID  2/2024: Hospatilized, discharged diagnosis Dental infection  Submandibular swelling, Thyromegaly    -Preoperative medication instructions were provided and reviewed with the patient.  Any additional testing or evaluation was explained to the patient.  NPO Instructions were discussed, and the patient's questions were answered prior to conclusion of this encounter. Patient verbalized understanding of preoperative instructions. After Visit Summary given.

## 2024-09-03 ENCOUNTER — ANESTHESIA EVENT (OUTPATIENT)
Dept: OPERATING ROOM | Facility: HOSPITAL | Age: 74
End: 2024-09-03
Payer: MEDICARE

## 2024-09-04 ENCOUNTER — HOSPITAL ENCOUNTER (OUTPATIENT)
Facility: HOSPITAL | Age: 74
LOS: 1 days | Discharge: HOME | End: 2024-09-05
Attending: SURGERY | Admitting: SURGERY
Payer: MEDICARE

## 2024-09-04 ENCOUNTER — ANESTHESIA (OUTPATIENT)
Dept: OPERATING ROOM | Facility: HOSPITAL | Age: 74
End: 2024-09-04
Payer: MEDICARE

## 2024-09-04 DIAGNOSIS — E04.9 SUBSTERNAL THYROID GOITER: Primary | ICD-10-CM

## 2024-09-04 PROBLEM — Z98.890 PONV (POSTOPERATIVE NAUSEA AND VOMITING): Status: ACTIVE | Noted: 2024-09-04

## 2024-09-04 PROBLEM — R11.2 PONV (POSTOPERATIVE NAUSEA AND VOMITING): Status: ACTIVE | Noted: 2024-09-04

## 2024-09-04 PROBLEM — N18.30 CHRONIC RENAL IMPAIRMENT, STAGE 3 (MODERATE) (MULTI): Status: ACTIVE | Noted: 2024-09-04

## 2024-09-04 LAB
ABO GROUP (TYPE) IN BLOOD: NORMAL
ALBUMIN SERPL BCP-MCNC: 4.4 G/DL (ref 3.4–5)
ANION GAP BLDV CALCULATED.4IONS-SCNC: 8 MMOL/L (ref 10–25)
ANION GAP SERPL CALC-SCNC: 15 MMOL/L (ref 10–20)
BASE EXCESS BLDV CALC-SCNC: 3.1 MMOL/L (ref -2–3)
BODY TEMPERATURE: 37 DEGREES CELSIUS
BUN SERPL-MCNC: 19 MG/DL (ref 6–23)
CA-I BLD-SCNC: 1.1 MMOL/L (ref 1.1–1.33)
CA-I BLDV-SCNC: 1.22 MMOL/L (ref 1.1–1.33)
CALCIUM SERPL-MCNC: 9 MG/DL (ref 8.6–10.6)
CHLORIDE BLDV-SCNC: 103 MMOL/L (ref 98–107)
CHLORIDE SERPL-SCNC: 103 MMOL/L (ref 98–107)
CO2 SERPL-SCNC: 24 MMOL/L (ref 21–32)
CREAT SERPL-MCNC: 0.97 MG/DL (ref 0.5–1.05)
EGFRCR SERPLBLD CKD-EPI 2021: 61 ML/MIN/1.73M*2
GLUCOSE BLDV-MCNC: 115 MG/DL (ref 74–99)
GLUCOSE SERPL-MCNC: 132 MG/DL (ref 74–99)
HCO3 BLDV-SCNC: 27.9 MMOL/L (ref 22–26)
HCT VFR BLD EST: 48 % (ref 36–46)
HGB BLDV-MCNC: 16.1 G/DL (ref 12–16)
INHALED O2 CONCENTRATION: 21 %
LACTATE BLDV-SCNC: 1.1 MMOL/L (ref 0.4–2)
OXYHGB MFR BLDV: 79.8 % (ref 45–75)
PCO2 BLDV: 42 MM HG (ref 41–51)
PH BLDV: 7.43 PH (ref 7.33–7.43)
PHOSPHATE SERPL-MCNC: 3.7 MG/DL (ref 2.5–4.9)
PO2 BLDV: 50 MM HG (ref 35–45)
POTASSIUM BLDV-SCNC: 4.2 MMOL/L (ref 3.5–5.3)
POTASSIUM SERPL-SCNC: 4.3 MMOL/L (ref 3.5–5.3)
RH FACTOR (ANTIGEN D): NORMAL
SAO2 % BLDV: 82 % (ref 45–75)
SODIUM BLDV-SCNC: 135 MMOL/L (ref 136–145)
SODIUM SERPL-SCNC: 138 MMOL/L (ref 136–145)

## 2024-09-04 PROCEDURE — 2500000001 HC RX 250 WO HCPCS SELF ADMINISTERED DRUGS (ALT 637 FOR MEDICARE OP): Performed by: STUDENT IN AN ORGANIZED HEALTH CARE EDUCATION/TRAINING PROGRAM

## 2024-09-04 PROCEDURE — 36415 COLL VENOUS BLD VENIPUNCTURE: CPT | Performed by: STUDENT IN AN ORGANIZED HEALTH CARE EDUCATION/TRAINING PROGRAM

## 2024-09-04 PROCEDURE — 2500000004 HC RX 250 GENERAL PHARMACY W/ HCPCS (ALT 636 FOR OP/ED): Performed by: ANESTHESIOLOGIST ASSISTANT

## 2024-09-04 PROCEDURE — 2500000004 HC RX 250 GENERAL PHARMACY W/ HCPCS (ALT 636 FOR OP/ED)

## 2024-09-04 PROCEDURE — 84132 ASSAY OF SERUM POTASSIUM: CPT | Performed by: STUDENT IN AN ORGANIZED HEALTH CARE EDUCATION/TRAINING PROGRAM

## 2024-09-04 PROCEDURE — 2500000002 HC RX 250 W HCPCS SELF ADMINISTERED DRUGS (ALT 637 FOR MEDICARE OP, ALT 636 FOR OP/ED): Performed by: STUDENT IN AN ORGANIZED HEALTH CARE EDUCATION/TRAINING PROGRAM

## 2024-09-04 PROCEDURE — 2720000007 HC OR 272 NO HCPCS: Performed by: SURGERY

## 2024-09-04 PROCEDURE — 2500000004 HC RX 250 GENERAL PHARMACY W/ HCPCS (ALT 636 FOR OP/ED): Performed by: STUDENT IN AN ORGANIZED HEALTH CARE EDUCATION/TRAINING PROGRAM

## 2024-09-04 PROCEDURE — 82330 ASSAY OF CALCIUM: CPT | Performed by: STUDENT IN AN ORGANIZED HEALTH CARE EDUCATION/TRAINING PROGRAM

## 2024-09-04 PROCEDURE — 1170000001 HC PRIVATE ONCOLOGY ROOM DAILY

## 2024-09-04 PROCEDURE — 3700000002 HC GENERAL ANESTHESIA TIME - EACH INCREMENTAL 1 MINUTE: Performed by: SURGERY

## 2024-09-04 PROCEDURE — 2500000005 HC RX 250 GENERAL PHARMACY W/O HCPCS

## 2024-09-04 PROCEDURE — 3600000004 HC OR TIME - INITIAL BASE CHARGE - PROCEDURE LEVEL FOUR: Performed by: SURGERY

## 2024-09-04 PROCEDURE — 7100000002 HC RECOVERY ROOM TIME - EACH INCREMENTAL 1 MINUTE: Performed by: SURGERY

## 2024-09-04 PROCEDURE — 60271 REMOVAL OF THYROID: CPT | Performed by: SURGERY

## 2024-09-04 PROCEDURE — 3600000009 HC OR TIME - EACH INCREMENTAL 1 MINUTE - PROCEDURE LEVEL FOUR: Performed by: SURGERY

## 2024-09-04 PROCEDURE — 88307 TISSUE EXAM BY PATHOLOGIST: CPT | Mod: TC,SUR | Performed by: SURGERY

## 2024-09-04 PROCEDURE — 84132 ASSAY OF SERUM POTASSIUM: CPT

## 2024-09-04 PROCEDURE — 2500000004 HC RX 250 GENERAL PHARMACY W/ HCPCS (ALT 636 FOR OP/ED): Mod: JG | Performed by: SURGERY

## 2024-09-04 PROCEDURE — 80069 RENAL FUNCTION PANEL: CPT

## 2024-09-04 PROCEDURE — 3700000001 HC GENERAL ANESTHESIA TIME - INITIAL BASE CHARGE: Performed by: SURGERY

## 2024-09-04 PROCEDURE — 2500000005 HC RX 250 GENERAL PHARMACY W/O HCPCS: Performed by: SURGERY

## 2024-09-04 PROCEDURE — 2500000004 HC RX 250 GENERAL PHARMACY W/ HCPCS (ALT 636 FOR OP/ED): Mod: JZ,JG | Performed by: STUDENT IN AN ORGANIZED HEALTH CARE EDUCATION/TRAINING PROGRAM

## 2024-09-04 PROCEDURE — 7100000001 HC RECOVERY ROOM TIME - INITIAL BASE CHARGE: Performed by: SURGERY

## 2024-09-04 PROCEDURE — 82435 ASSAY OF BLOOD CHLORIDE: CPT | Performed by: STUDENT IN AN ORGANIZED HEALTH CARE EDUCATION/TRAINING PROGRAM

## 2024-09-04 PROCEDURE — P9045 ALBUMIN (HUMAN), 5%, 250 ML: HCPCS | Mod: JZ,JG | Performed by: STUDENT IN AN ORGANIZED HEALTH CARE EDUCATION/TRAINING PROGRAM

## 2024-09-04 PROCEDURE — 2500000005 HC RX 250 GENERAL PHARMACY W/O HCPCS: Performed by: ANESTHESIOLOGIST ASSISTANT

## 2024-09-04 RX ORDER — ONDANSETRON HYDROCHLORIDE 2 MG/ML
4 INJECTION, SOLUTION INTRAVENOUS ONCE AS NEEDED
Status: DISCONTINUED | OUTPATIENT
Start: 2024-09-04 | End: 2024-09-04 | Stop reason: HOSPADM

## 2024-09-04 RX ORDER — HYDROMORPHONE HYDROCHLORIDE 1 MG/ML
0.2 INJECTION, SOLUTION INTRAMUSCULAR; INTRAVENOUS; SUBCUTANEOUS EVERY 5 MIN PRN
Status: DISCONTINUED | OUTPATIENT
Start: 2024-09-04 | End: 2024-09-04 | Stop reason: HOSPADM

## 2024-09-04 RX ORDER — SODIUM CHLORIDE, SODIUM LACTATE, POTASSIUM CHLORIDE, CALCIUM CHLORIDE 600; 310; 30; 20 MG/100ML; MG/100ML; MG/100ML; MG/100ML
20 INJECTION, SOLUTION INTRAVENOUS CONTINUOUS
Status: DISCONTINUED | OUTPATIENT
Start: 2024-09-04 | End: 2024-09-04

## 2024-09-04 RX ORDER — ONDANSETRON HYDROCHLORIDE 2 MG/ML
8 INJECTION, SOLUTION INTRAVENOUS EVERY 6 HOURS PRN
Status: DISCONTINUED | OUTPATIENT
Start: 2024-09-04 | End: 2024-09-05 | Stop reason: HOSPADM

## 2024-09-04 RX ORDER — PHENYLEPHRINE HCL IN 0.9% NACL 0.4MG/10ML
SYRINGE (ML) INTRAVENOUS AS NEEDED
Status: DISCONTINUED | OUTPATIENT
Start: 2024-09-04 | End: 2024-09-04

## 2024-09-04 RX ORDER — BUPIVACAINE HYDROCHLORIDE 5 MG/ML
INJECTION, SOLUTION PERINEURAL AS NEEDED
Status: DISCONTINUED | OUTPATIENT
Start: 2024-09-04 | End: 2024-09-04 | Stop reason: HOSPADM

## 2024-09-04 RX ORDER — OXYCODONE HYDROCHLORIDE 5 MG/1
5 TABLET ORAL EVERY 4 HOURS PRN
Status: DISCONTINUED | OUTPATIENT
Start: 2024-09-04 | End: 2024-09-04 | Stop reason: HOSPADM

## 2024-09-04 RX ORDER — SODIUM CHLORIDE 0.9 G/100ML
IRRIGANT IRRIGATION AS NEEDED
Status: DISCONTINUED | OUTPATIENT
Start: 2024-09-04 | End: 2024-09-04 | Stop reason: HOSPADM

## 2024-09-04 RX ORDER — OXYCODONE HYDROCHLORIDE 5 MG/1
5 TABLET ORAL EVERY 4 HOURS PRN
Status: DISCONTINUED | OUTPATIENT
Start: 2024-09-04 | End: 2024-09-05 | Stop reason: HOSPADM

## 2024-09-04 RX ORDER — CALCIUM CARBONATE 1250 MG/5ML
2500 SUSPENSION ORAL 3 TIMES DAILY PRN
Status: DISCONTINUED | OUTPATIENT
Start: 2024-09-04 | End: 2024-09-05 | Stop reason: HOSPADM

## 2024-09-04 RX ORDER — NALOXONE HYDROCHLORIDE 0.4 MG/ML
0.2 INJECTION, SOLUTION INTRAMUSCULAR; INTRAVENOUS; SUBCUTANEOUS EVERY 5 MIN PRN
Status: DISCONTINUED | OUTPATIENT
Start: 2024-09-04 | End: 2024-09-05 | Stop reason: HOSPADM

## 2024-09-04 RX ORDER — ALBUMIN HUMAN 50 G/1000ML
12.5 SOLUTION INTRAVENOUS ONCE
Status: COMPLETED | OUTPATIENT
Start: 2024-09-04 | End: 2024-09-04

## 2024-09-04 RX ORDER — LIDOCAINE HYDROCHLORIDE 20 MG/ML
INJECTION, SOLUTION INFILTRATION; PERINEURAL AS NEEDED
Status: DISCONTINUED | OUTPATIENT
Start: 2024-09-04 | End: 2024-09-04

## 2024-09-04 RX ORDER — LIDOCAINE HYDROCHLORIDE 10 MG/ML
0.1 INJECTION INFILTRATION; PERINEURAL ONCE
Status: DISCONTINUED | OUTPATIENT
Start: 2024-09-04 | End: 2024-09-04 | Stop reason: HOSPADM

## 2024-09-04 RX ORDER — SUCCINYLCHOLINE CHLORIDE 20 MG/ML
INJECTION INTRAMUSCULAR; INTRAVENOUS AS NEEDED
Status: DISCONTINUED | OUTPATIENT
Start: 2024-09-04 | End: 2024-09-04

## 2024-09-04 RX ORDER — GLYCOPYRROLATE 0.2 MG/ML
INJECTION INTRAMUSCULAR; INTRAVENOUS AS NEEDED
Status: DISCONTINUED | OUTPATIENT
Start: 2024-09-04 | End: 2024-09-04

## 2024-09-04 RX ORDER — HYDROMORPHONE HYDROCHLORIDE 1 MG/ML
0.5 INJECTION, SOLUTION INTRAMUSCULAR; INTRAVENOUS; SUBCUTANEOUS EVERY 5 MIN PRN
Status: DISCONTINUED | OUTPATIENT
Start: 2024-09-04 | End: 2024-09-04 | Stop reason: HOSPADM

## 2024-09-04 RX ORDER — DEXMEDETOMIDINE HYDROCHLORIDE 4 UG/ML
INJECTION, SOLUTION INTRAVENOUS CONTINUOUS PRN
Status: DISCONTINUED | OUTPATIENT
Start: 2024-09-04 | End: 2024-09-04

## 2024-09-04 RX ORDER — CALCIUM CARBONATE 500(1250)
2500 TABLET ORAL 3 TIMES DAILY PRN
Status: DISCONTINUED | OUTPATIENT
Start: 2024-09-04 | End: 2024-09-05 | Stop reason: HOSPADM

## 2024-09-04 RX ORDER — CEFAZOLIN 1 G/1
INJECTION, POWDER, FOR SOLUTION INTRAVENOUS AS NEEDED
Status: DISCONTINUED | OUTPATIENT
Start: 2024-09-04 | End: 2024-09-04

## 2024-09-04 RX ORDER — ALBUMIN HUMAN 50 G/1000ML
25 SOLUTION INTRAVENOUS ONCE
Status: COMPLETED | OUTPATIENT
Start: 2024-09-04 | End: 2024-09-04

## 2024-09-04 RX ORDER — ROCURONIUM BROMIDE 10 MG/ML
INJECTION, SOLUTION INTRAVENOUS AS NEEDED
Status: DISCONTINUED | OUTPATIENT
Start: 2024-09-04 | End: 2024-09-04

## 2024-09-04 RX ORDER — SODIUM CHLORIDE, SODIUM LACTATE, POTASSIUM CHLORIDE, CALCIUM CHLORIDE 600; 310; 30; 20 MG/100ML; MG/100ML; MG/100ML; MG/100ML
75 INJECTION, SOLUTION INTRAVENOUS CONTINUOUS
Status: DISCONTINUED | OUTPATIENT
Start: 2024-09-04 | End: 2024-09-05

## 2024-09-04 RX ORDER — MIDAZOLAM HYDROCHLORIDE 1 MG/ML
INJECTION INTRAMUSCULAR; INTRAVENOUS AS NEEDED
Status: DISCONTINUED | OUTPATIENT
Start: 2024-09-04 | End: 2024-09-04

## 2024-09-04 RX ORDER — ACETAMINOPHEN 325 MG/1
650 TABLET ORAL EVERY 6 HOURS
Status: DISCONTINUED | OUTPATIENT
Start: 2024-09-04 | End: 2024-09-05 | Stop reason: HOSPADM

## 2024-09-04 RX ORDER — OXYCODONE HYDROCHLORIDE 5 MG/1
10 TABLET ORAL EVERY 4 HOURS PRN
Status: DISCONTINUED | OUTPATIENT
Start: 2024-09-04 | End: 2024-09-04 | Stop reason: HOSPADM

## 2024-09-04 RX ORDER — TRAMADOL HYDROCHLORIDE 50 MG/1
50 TABLET ORAL EVERY 6 HOURS PRN
Status: DISCONTINUED | OUTPATIENT
Start: 2024-09-04 | End: 2024-09-05 | Stop reason: HOSPADM

## 2024-09-04 RX ORDER — METOPROLOL TARTRATE 50 MG/1
50 TABLET ORAL 2 TIMES DAILY
Status: DISCONTINUED | OUTPATIENT
Start: 2024-09-04 | End: 2024-09-05 | Stop reason: HOSPADM

## 2024-09-04 RX ORDER — SODIUM CHLORIDE, SODIUM LACTATE, POTASSIUM CHLORIDE, CALCIUM CHLORIDE 600; 310; 30; 20 MG/100ML; MG/100ML; MG/100ML; MG/100ML
100 INJECTION, SOLUTION INTRAVENOUS CONTINUOUS
Status: DISCONTINUED | OUTPATIENT
Start: 2024-09-04 | End: 2024-09-04 | Stop reason: HOSPADM

## 2024-09-04 RX ORDER — CALCIUM GLUCONATE 20 MG/ML
1 INJECTION, SOLUTION INTRAVENOUS 3 TIMES DAILY PRN
Status: DISCONTINUED | OUTPATIENT
Start: 2024-09-04 | End: 2024-09-05 | Stop reason: HOSPADM

## 2024-09-04 RX ORDER — LEVOTHYROXINE SODIUM 75 UG/1
75 TABLET ORAL DAILY
Status: DISCONTINUED | OUTPATIENT
Start: 2024-09-05 | End: 2024-09-05

## 2024-09-04 RX ORDER — FENTANYL CITRATE 50 UG/ML
INJECTION, SOLUTION INTRAMUSCULAR; INTRAVENOUS AS NEEDED
Status: DISCONTINUED | OUTPATIENT
Start: 2024-09-04 | End: 2024-09-04

## 2024-09-04 RX ORDER — ACETAMINOPHEN 10 MG/ML
INJECTION, SOLUTION INTRAVENOUS AS NEEDED
Status: DISCONTINUED | OUTPATIENT
Start: 2024-09-04 | End: 2024-09-04

## 2024-09-04 RX ORDER — ONDANSETRON HYDROCHLORIDE 2 MG/ML
INJECTION, SOLUTION INTRAVENOUS AS NEEDED
Status: DISCONTINUED | OUTPATIENT
Start: 2024-09-04 | End: 2024-09-04

## 2024-09-04 RX ORDER — APREPITANT 40 MG/1
40 CAPSULE ORAL ONCE
Status: COMPLETED | OUTPATIENT
Start: 2024-09-04 | End: 2024-09-04

## 2024-09-04 RX ORDER — PROPOFOL 10 MG/ML
INJECTION, EMULSION INTRAVENOUS AS NEEDED
Status: DISCONTINUED | OUTPATIENT
Start: 2024-09-04 | End: 2024-09-04

## 2024-09-04 RX ORDER — REMIFENTANIL HYDROCHLORIDE 1 MG/ML
INJECTION, POWDER, LYOPHILIZED, FOR SOLUTION INTRAVENOUS CONTINUOUS PRN
Status: DISCONTINUED | OUTPATIENT
Start: 2024-09-04 | End: 2024-09-04

## 2024-09-04 SDOH — SOCIAL STABILITY: SOCIAL INSECURITY: DOES ANYONE TRY TO KEEP YOU FROM HAVING/CONTACTING OTHER FRIENDS OR DOING THINGS OUTSIDE YOUR HOME?: NO

## 2024-09-04 SDOH — SOCIAL STABILITY: SOCIAL INSECURITY: DO YOU FEEL UNSAFE GOING BACK TO THE PLACE WHERE YOU ARE LIVING?: NO

## 2024-09-04 SDOH — SOCIAL STABILITY: SOCIAL INSECURITY: HAS ANYONE EVER THREATENED TO HURT YOUR FAMILY OR YOUR PETS?: NO

## 2024-09-04 SDOH — SOCIAL STABILITY: SOCIAL INSECURITY: ARE YOU OR HAVE YOU BEEN THREATENED OR ABUSED PHYSICALLY, EMOTIONALLY, OR SEXUALLY BY ANYONE?: NO

## 2024-09-04 SDOH — SOCIAL STABILITY: SOCIAL INSECURITY: DO YOU FEEL ANYONE HAS EXPLOITED OR TAKEN ADVANTAGE OF YOU FINANCIALLY OR OF YOUR PERSONAL PROPERTY?: NO

## 2024-09-04 SDOH — SOCIAL STABILITY: SOCIAL INSECURITY: ABUSE: ADULT

## 2024-09-04 SDOH — SOCIAL STABILITY: SOCIAL INSECURITY: WERE YOU ABLE TO COMPLETE ALL THE BEHAVIORAL HEALTH SCREENINGS?: YES

## 2024-09-04 SDOH — SOCIAL STABILITY: SOCIAL INSECURITY: HAVE YOU HAD ANY THOUGHTS OF HARMING ANYONE ELSE?: NO

## 2024-09-04 SDOH — SOCIAL STABILITY: SOCIAL INSECURITY: HAVE YOU HAD THOUGHTS OF HARMING ANYONE ELSE?: NO

## 2024-09-04 SDOH — SOCIAL STABILITY: SOCIAL INSECURITY: ARE THERE ANY APPARENT SIGNS OF INJURIES/BEHAVIORS THAT COULD BE RELATED TO ABUSE/NEGLECT?: NO

## 2024-09-04 ASSESSMENT — COGNITIVE AND FUNCTIONAL STATUS - GENERAL
HELP NEEDED FOR BATHING: A LITTLE
TOILETING: A LITTLE
PATIENT BASELINE BEDBOUND: NO
DAILY ACTIVITIY SCORE: 22
MOBILITY SCORE: 21
WALKING IN HOSPITAL ROOM: A LITTLE
DAILY ACTIVITIY SCORE: 22
HELP NEEDED FOR BATHING: A LITTLE
MOBILITY SCORE: 21
TOILETING: A LITTLE
CLIMB 3 TO 5 STEPS WITH RAILING: A LITTLE
WALKING IN HOSPITAL ROOM: A LITTLE
MOVING TO AND FROM BED TO CHAIR: A LITTLE
MOVING TO AND FROM BED TO CHAIR: A LITTLE
CLIMB 3 TO 5 STEPS WITH RAILING: A LITTLE

## 2024-09-04 ASSESSMENT — COLUMBIA-SUICIDE SEVERITY RATING SCALE - C-SSRS
1. IN THE PAST MONTH, HAVE YOU WISHED YOU WERE DEAD OR WISHED YOU COULD GO TO SLEEP AND NOT WAKE UP?: NO
6. HAVE YOU EVER DONE ANYTHING, STARTED TO DO ANYTHING, OR PREPARED TO DO ANYTHING TO END YOUR LIFE?: NO
2. HAVE YOU ACTUALLY HAD ANY THOUGHTS OF KILLING YOURSELF?: NO

## 2024-09-04 ASSESSMENT — PAIN SCALES - GENERAL
PAINLEVEL_OUTOF10: 4
PAINLEVEL_OUTOF10: 3
PAINLEVEL_OUTOF10: 6
PAINLEVEL_OUTOF10: 3
PAINLEVEL_OUTOF10: 4
PAINLEVEL_OUTOF10: 6
PAINLEVEL_OUTOF10: 5 - MODERATE PAIN
PAINLEVEL_OUTOF10: 6
PAINLEVEL_OUTOF10: 4
PAINLEVEL_OUTOF10: 5 - MODERATE PAIN
PAINLEVEL_OUTOF10: 4
PAINLEVEL_OUTOF10: 3
PAINLEVEL_OUTOF10: 4

## 2024-09-04 ASSESSMENT — LIFESTYLE VARIABLES
SKIP TO QUESTIONS 9-10: 1
AUDIT-C TOTAL SCORE: 0
HOW MANY STANDARD DRINKS CONTAINING ALCOHOL DO YOU HAVE ON A TYPICAL DAY: PATIENT DOES NOT DRINK
HOW OFTEN DO YOU HAVE A DRINK CONTAINING ALCOHOL: NEVER
AUDIT-C TOTAL SCORE: 0
HOW OFTEN DO YOU HAVE 6 OR MORE DRINKS ON ONE OCCASION: NEVER

## 2024-09-04 ASSESSMENT — ACTIVITIES OF DAILY LIVING (ADL)
PATIENT'S MEMORY ADEQUATE TO SAFELY COMPLETE DAILY ACTIVITIES?: YES
HEARING - LEFT EAR: FUNCTIONAL
DRESSING YOURSELF: INDEPENDENT
WALKS IN HOME: INDEPENDENT
LACK_OF_TRANSPORTATION: PATIENT DECLINED
BATHING: INDEPENDENT
TOILETING: INDEPENDENT
JUDGMENT_ADEQUATE_SAFELY_COMPLETE_DAILY_ACTIVITIES: YES
GROOMING: INDEPENDENT
ADEQUATE_TO_COMPLETE_ADL: YES
HEARING - RIGHT EAR: FUNCTIONAL
FEEDING YOURSELF: INDEPENDENT

## 2024-09-04 ASSESSMENT — PAIN - FUNCTIONAL ASSESSMENT

## 2024-09-04 ASSESSMENT — PATIENT HEALTH QUESTIONNAIRE - PHQ9
SUM OF ALL RESPONSES TO PHQ9 QUESTIONS 1 & 2: 0
2. FEELING DOWN, DEPRESSED OR HOPELESS: NOT AT ALL
1. LITTLE INTEREST OR PLEASURE IN DOING THINGS: NOT AT ALL

## 2024-09-04 ASSESSMENT — PAIN DESCRIPTION - ORIENTATION: ORIENTATION: RIGHT

## 2024-09-04 ASSESSMENT — PAIN DESCRIPTION - LOCATION: LOCATION: NECK

## 2024-09-04 NOTE — ANESTHESIA PREPROCEDURE EVALUATION
Patient: Sharon Duenas    Procedure Information       Date/Time: 09/04/24 0930    Procedure: Thyroidectomy (Bilateral)    Location: [facility] OR 22 / [facility] OR    Surgeons: Walter Perez MD            Relevant Problems   Anesthesia   (+) PONV (postoperative nausea and vomiting)      Cardiac   (+) HTN (hypertension)   (+) Hyperlipidemia      Pulmonary (within normal limits)      Neuro (within normal limits)      GI (within normal limits)      /Renal   (+) Chronic renal impairment, stage 3 (moderate) (Multi)      Liver (within normal limits)      Endocrine   (+) Acquired hypothyroidism   (+) Substernal thyroid goiter      Hematology (within normal limits)      Musculoskeletal (within normal limits)      HEENT (within normal limits)      ID   (+) Dental infection      Skin (within normal limits)      GYN (within normal limits)       Clinical information reviewed:    Allergies  Meds               NPO Detail:  NPO/Void Status  Carbohydrate Drink Given Prior to Surgery? : N  Date of Last Liquid: 09/04/24  Time of Last Liquid: 0600  Date of Last Solid: 09/03/24  Time of Last Solid: 2230  Last Intake Type: Clear fluids         Physical Exam    Airway  Mallampati: III  TM distance: >3 FB  Neck ROM: full     Cardiovascular   Rhythm: regular  Rate: normal     Dental   Comments: Partial dentures removed   Pulmonary - normal exam     Abdominal   (+) obese         Anesthesia Plan    History of general anesthesia?: yes  History of complications of general anesthesia?: no    ASA 3     general     intravenous induction   Postoperative administration of opioids is intended.  Trial extubation is planned.  Anesthetic plan and risks discussed with patient.  Use of blood products discussed with patient who consented to blood products.    Plan discussed with CAA and attending.

## 2024-09-04 NOTE — ANESTHESIA PROCEDURE NOTES
Peripheral IV  Date/Time: 9/4/2024 9:39 AM      Placement  Needle size: 18 G  Laterality: left  Location: forearm  Site prep: alcohol

## 2024-09-04 NOTE — SIGNIFICANT EVENT
Sahron Duenas is a 74 yof 4 hours s/p thyroidectomy via substernal cervical approach     Patient was seen bedside with her daughter, patient reported sore throat and mild pain around the surgical  site, patient denies difficulty swallowing or breathing    Physical Exam  Constitutional:       General: She is not in acute distress.     Appearance: Normal appearance.   HENT:      Mouth/Throat:      Mouth: Mucous membranes are moist.   Eyes:      Extraocular Movements: Extraocular movements intact.   Neck:      Comments: Dressing in place over  incision site  Cardiovascular:      Rate and Rhythm: Normal rate and regular rhythm.   Pulmonary:      Effort: Pulmonary effort is normal.   Skin:     General: Skin is warm and dry.   Neurological:      Mental Status: She is alert and oriented to person, place, and time.   Psychiatric:         Behavior: Behavior normal.      A/P: 74 yof 4 hours s/p thyroidectomy via substernal cervical approach, patient is in stable condition.    Plan:  .NEURO/PAIN/SEDATION: post op pain   -tylenol q6h, oxy5 q4h prn , tramadol 50 q6h prn prn   -pain assessment per shift    RESPIRATORY:   -on RA  -IS    CARDIOVASC: HTN  -continue metoprolol 50mg BID  -EKG prn for ACS symptoms    GI:   -no intervention indicated    :   -strict I/O     FEN:   - continuous LR 75ml/hr  -check PM labs/ calcium level  and replete prn  -regular diet     HEMATOLOGIC:   -no indication for transfusion     ENDOCRINE:   -levothyroxine 75mcg daily    MUSCULOSKELETAL/SKIN:   -no intervention indicated     INFECTIOUS DISEASE:   -no intervention indicated    GI PROPHYLAXIS:   -no intervention indicated    DVT PROPHYLAXIS:   SCDs    DISPOSITION: SEPIDEH Bullock, BENSON  Surgical oncology   [facility] team [id number]

## 2024-09-04 NOTE — ANESTHESIA PROCEDURE NOTES
Airway  Date/Time: 9/4/2024 10:17 AM  Urgency: elective    Airway not difficult    Staffing  Performed: SILVIO   Authorized by: Yosef Walters MD    Performed by: SILVIO Rivers  Patient location during procedure: OR    Indications and Patient Condition  Indications for airway management: anesthesia  Spontaneous ventilation: present  Sedation level: deep  Preoxygenated: yes  Patient position: sniffing  Mask difficulty assessment: 0 - not attempted    Final Airway Details  Final airway type: endotracheal airway      Successful airway: ETT  Cuffed: yes   Successful intubation technique: video laryngoscopy  Cormack-Lehane Classification: grade I - full view of glottis  Placement verified by: chest auscultation and capnometry   Measured from: lips  Number of attempts at approach: 1  Ventilation between attempts: BVM    Additional Comments  RSI.  Elective glidescope.  Lips and teeth in preop condition.

## 2024-09-04 NOTE — OP NOTE
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]     Procedures  cervical resection substernal goiter  75023 - [place] THYROIDECTOMY SUBSTERNAL CERVICAL APPROACH      Surgeons      * Walter Perez - Primary    Resident/Fellow/Other Assistant:  Surgeons and Role:     * Vicki Bullock DMD - Resident - Assisting     * Tuan Shell MD - Resident - Assisting    Procedure Summary  Anesthesia: General  ASA: III  Anesthesia Staff: Anesthesiologist: Yosef Walters MD  C-AA: SILVIO Rivers  Anesthesia Resident: Jean-Paul Anderson MD  Estimated Blood Loss: 20mL  Intra-op Medications: * Intraprocedure medication information is unavailable because the case start and end events have not been set *           Anesthesia Record               Intraprocedure I/O Totals          Intake    Dexmedetomidine 0.00 mL    The total shown is the total volume documented since Anesthesia Start was filed.    LR bolus 500.00 mL    Remifentanil Drip 0.00 mL    The total shown is the total volume documented since Anesthesia Start was filed.    Total Intake 500 mL       Output    Est. Blood Loss 10 mL    Total Output 10 mL       Net    Net Volume 490 mL          Specimen:   ID Type Source Tests Collected by Time   1 : total thyroidectomy Tissue THYROID TOTAL THYROIDECTOMY SURGICAL PATHOLOGY EXAM Walter Perez MD 2024 1047        Staff:   Circulator: Funmilayo Grayson Person: Delmer  Circulator: Mirta  Relief Scrub: Mirta         Drains and/or Catheters: * None in log *    Tourniquet Times:         Implants:     Findings: Large cervical goiter with substernal extension bilaterally down below the level of the brachiocephalic artery.     Indications: Sharon Duenas is an 74  y.o. 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  Patient has a large substernal goiter extending down below the level of the brachiocephalic artery.  It is causing tracheal deviation from right to left as well as tracheal compression.  Risk benefits of surgery including possible sternotomy were discussed with the patient.  She is already on thyroid hormone replacement and will continue    The patient was seen in the preoperative area. The risks, benefits, complications, treatment options, non-operative alternatives, expected recovery and outcomes were discussed with the patient. The possibilities of reaction to medication, pulmonary aspiration, injury to surrounding structures, bleeding, recurrent infection, the need for additional procedures, failure to diagnose a condition, and creating a complication requiring transfusion or operation were discussed with the patient. The patient concurred with the proposed plan, giving informed consent.  The site of surgery was properly noted/marked if necessary per policy. The patient has been actively warmed in preoperative area. Preoperative antibiotics have been ordered and given within 1 hours of incision. Venous thrombosis prophylaxis have been ordered including bilateral sequential compression devices    Procedure Details: On the operative date patient's brought to the operating room after induction anesthetic she is prepped and draped in usual sterile fashion all the way down on her upper abdomen in the event of need for sternotomy.  She had SCDs on for DVT prophylaxis and cefazolin his antibiotic.  We made a 10 cm cervical collar incision.  We raised subplatysmal flaps superior to the notch and the thyroid cartilage inferior to the sternal notch and laterally over the sternocleidomastoid muscles.  We then  the strap muscles in the midline.  We started on the left side retracting left sternohyoid left sternothyroid muscles out laterally  to level the carotid artery.  We have to divide a small portion of the upper part of the sternothyroid musculature for better exposure of the upper pole.  Middle thyroid vein was identified ligated divided with 2-0 silk ties.  We then identified the cricothyroid space.  Began working medial to lateral around the superior pole vessels.  We used 2-0 and 3-0 silk ties proximally and LigaSure toward the thyroid specimen side or 2-0 silk ties as needed based on vessel size.  At the 1 o'clock position was a normal-appearing left superior parathyroid gland peeled away intact and well-vascularized pedicle.  We then went down over the trachea.  Patient had extension of the mass down into the upper part of the chest.  He began gently retracting on the thyroid with a surgical sponge.  We then began taking capsular vessels as we pulled that up and out of the chest.  I was able to get 1 finger along the lateral aspect down over the carotid and went over the trachea and continued point the rest of the thyroid up and out of the chest we took all the last of the vascular attachments with 2-0 silk ties.  We then rolled the thyroid gland up medially.  Anesthesia had placed an esophageal temperature probe which was palpable.  We dissected down into the tracheoesophageal groove and identified the inferior thyroid artery.  Coursing anterior to the artery was the left recurrent laryngeal nerve.  We stayed anteromedial to the nerve with all 3-0 silk ties and followed that up.  LigaSure device was not used in this area.  At about the 3 o'clock position of the thyroid capsule was a normal-appearing left inferior parathyroid gland peeled away intact neurovascular pedicle.  We went back and then divided the inferior thyroid artery that was coursing underneath the right recurrent nerve medial to the nerve towards the thyroid side with 2-0 silk ties.  We then followed the last the nerve and the cricothyroid musculature and rolled the thyroid  gland up onto the trachea.    We divided behind the isthmus with Bovie electrocautery.  There was no pyramidal lobe noted.  There were no abnormal central neck lymph nodes.    We then went to the right side retracting the strap muscles again out to the carotid artery.  Middle thyroid vein was divided with 2-0 silk ties.  Again we identified cricothyroid space and went medial to lateral and superior pole vessels.  At about the 11 o'clock position was a normal-appearing right superior parathyroid gland peeled away intact and well-vascularized pedicle.  We then went off the trachea.  Patient had similar extension of the goiter down in behind the right clavicle.  Again we began gentle retraction pulling this up and out of the chest serially taking capsular vessel branches with 2-0 silk ties as needed.  Ultimately able to free all of that up and out of the chest as well completing the cervical resection of the substernal goiter.  We then continued working along the posterolateral aspect of the thyroid.  At the 8 o'clock position was a normal-appearing right inferior parathyroid gland peeled away intact neurovascular is pedicle.  We then rolled the thyroid gland and medially.  We identified the inferior thyroid artery.  On this side the recurrent laryngeal nerve was in a more anatomic position going posterior to the artery.  We divided the artery crossing over the nerve with 2-0 silk ties.  We continued following the nerve all the way up into the cricothyroid musculature.  LigaSure was not used in this area.  We followed that all the way up into the musculature rolled the thyroid gland up on the trachea and remove the specimen.    Specimen was marked with a double tailed suture in the right superior pole single tailed suture in the lesser pole and sent off pathology.  We then irrigated the neck out well and achieved hemostasis.  Both recurrent nerves and the branches were intact.  Overall hemostasis was good.  Parathyroid  glands looked well-vascularized.  The left inferior was slightly dusky but appeared to have reasonable vascular flow.  We used Akua powder for final hemostasis.  We then closed traps platysma with 3-0 Vicryl skin with 4 Monocryl half percent Marcaine was used local anesthetic dry sterile dressings were applied.  Complications:  None; patient tolerated the procedure well.    Disposition: PACU - hemodynamically stable.  Condition: stable         Additional Details:     Attending Attestation: I was present for the entire procedure.    Walter Perez  Phone Number: [phone]

## 2024-09-04 NOTE — PERIOPERATIVE NURSING NOTE
1410- Dr Walters at bedside to assess patient irregular heart rate. No interventions needed at this time.     1430- Dr Perez at bedside to assess and update patient.

## 2024-09-04 NOTE — BRIEF OP NOTE
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]     Procedures  cervical resection substernal goiter  [address]1723 - [place] THYROIDECTOMY SUBSTERNAL CERVICAL APPROACH      Surgeons      * Walter Perez - Primary    Resident/Fellow/Other Assistant:  Surgeons and Role:     * Vicki Bullock DMD - Resident - Assisting     * Tuan Shell MD - Resident - Assisting    Procedure Summary  Anesthesia: General  ASA: III  Anesthesia Staff: Anesthesiologist: Yosef Walters MD  C-AA: SILVIO Rivers  Anesthesia Resident: Jean-Paul Anderson MD  Estimated Blood Loss: 10mL  Intra-op Medications: * Intraprocedure medication information is unavailable because the case start and end events have not been set *           Anesthesia Record               Intraprocedure I/O Totals          Intake    Dexmedetomidine 20.17 mL    The total shown is the total volume documented since Anesthesia Start was filed.    LR bolus 900.00 mL    Remifentanil Drip 14.81 mL    The total shown is the total volume documented since Anesthesia Start was filed.    acetaminophen (Ofirmev) injection 100.00 mL    Total Intake 1034.98 mL       Output    Est. Blood Loss 10 mL    Total Output 10 mL       Net    Net Volume 1024.98 mL          Specimen:   ID Type Source Tests Collected by Time   1 : total thyroidectomy Tissue THYROID TOTAL THYROIDECTOMY SURGICAL PATHOLOGY EXAM Walter Perez MD 2024 1047        Staff:   Circulator: Funmilayo Grayson Person: Delmer  Circulator: Mirta  Relief Scrub: Mirta          Findings: cervical goiter with substernal extension     Complications:  None; patient tolerated the procedure well.     Disposition: PACU - hemodynamically stable.  Condition: stable  Specimens Collected:   ID Type Source Tests Collected by Time   1 :  total thyroidectomy Tissue THYROID TOTAL THYROIDECTOMY SURGICAL PATHOLOGY EXAM Walter Perez MD 9/4/2024 1041     Attending Attestation:     Walter Perez  Phone Number: [phone]

## 2024-09-04 NOTE — ANESTHESIA POSTPROCEDURE EVALUATION
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    Anesthesia Post Evaluation    Patient location during evaluation: PACU  Patient participation: complete - patient participated  Level of consciousness: sleepy but conscious  Pain management: adequate  Airway patency: patent  Cardiovascular status: acceptable  Respiratory status: acceptable  Hydration status: acceptable  Postoperative Nausea and Vomiting: none        No notable events documented.

## 2024-09-05 ENCOUNTER — APPOINTMENT (OUTPATIENT)
Dept: RADIOLOGY | Facility: HOSPITAL | Age: 74
End: 2024-09-05
Payer: MEDICARE

## 2024-09-05 VITALS
WEIGHT: 234.57 LBS | OXYGEN SATURATION: 98 % | RESPIRATION RATE: 18 BRPM | HEIGHT: 63 IN | SYSTOLIC BLOOD PRESSURE: 118 MMHG | HEART RATE: 74 BPM | TEMPERATURE: 97.9 F | DIASTOLIC BLOOD PRESSURE: 52 MMHG | BODY MASS INDEX: 41.56 KG/M2

## 2024-09-05 PROBLEM — Z98.890 PONV (POSTOPERATIVE NAUSEA AND VOMITING): Status: RESOLVED | Noted: 2024-09-04 | Resolved: 2024-09-05

## 2024-09-05 PROBLEM — R11.2 PONV (POSTOPERATIVE NAUSEA AND VOMITING): Status: RESOLVED | Noted: 2024-09-04 | Resolved: 2024-09-05

## 2024-09-05 LAB
ALBUMIN SERPL BCP-MCNC: 4.1 G/DL (ref 3.4–5)
ANION GAP SERPL CALC-SCNC: 12 MMOL/L (ref 10–20)
BUN SERPL-MCNC: 20 MG/DL (ref 6–23)
CA-I BLD-SCNC: 1.11 MMOL/L (ref 1.1–1.33)
CALCIUM SERPL-MCNC: 9 MG/DL (ref 8.6–10.6)
CHLORIDE SERPL-SCNC: 104 MMOL/L (ref 98–107)
CO2 SERPL-SCNC: 27 MMOL/L (ref 21–32)
CREAT SERPL-MCNC: 0.95 MG/DL (ref 0.5–1.05)
EGFRCR SERPLBLD CKD-EPI 2021: 62 ML/MIN/1.73M*2
ERYTHROCYTE [DISTWIDTH] IN BLOOD BY AUTOMATED COUNT: 13.4 % (ref 11.5–14.5)
GLUCOSE SERPL-MCNC: 115 MG/DL (ref 74–99)
HCT VFR BLD AUTO: 43 % (ref 36–46)
HGB BLD-MCNC: 13.6 G/DL (ref 12–16)
MCH RBC QN AUTO: 27.7 PG (ref 26–34)
MCHC RBC AUTO-ENTMCNC: 31.6 G/DL (ref 32–36)
MCV RBC AUTO: 88 FL (ref 80–100)
NRBC BLD-RTO: 0 /100 WBCS (ref 0–0)
PHOSPHATE SERPL-MCNC: 3.7 MG/DL (ref 2.5–4.9)
PLATELET # BLD AUTO: 208 X10*3/UL (ref 150–450)
POTASSIUM SERPL-SCNC: 4.4 MMOL/L (ref 3.5–5.3)
RBC # BLD AUTO: 4.91 X10*6/UL (ref 4–5.2)
SODIUM SERPL-SCNC: 139 MMOL/L (ref 136–145)
WBC # BLD AUTO: 15.1 X10*3/UL (ref 4.4–11.3)

## 2024-09-05 PROCEDURE — 82330 ASSAY OF CALCIUM: CPT | Performed by: STUDENT IN AN ORGANIZED HEALTH CARE EDUCATION/TRAINING PROGRAM

## 2024-09-05 PROCEDURE — 85027 COMPLETE CBC AUTOMATED: CPT | Performed by: STUDENT IN AN ORGANIZED HEALTH CARE EDUCATION/TRAINING PROGRAM

## 2024-09-05 PROCEDURE — 36415 COLL VENOUS BLD VENIPUNCTURE: CPT | Performed by: STUDENT IN AN ORGANIZED HEALTH CARE EDUCATION/TRAINING PROGRAM

## 2024-09-05 PROCEDURE — 99024 POSTOP FOLLOW-UP VISIT: CPT | Performed by: NURSE PRACTITIONER

## 2024-09-05 PROCEDURE — 71045 X-RAY EXAM CHEST 1 VIEW: CPT | Performed by: RADIOLOGY

## 2024-09-05 PROCEDURE — 7100000011 HC EXTENDED STAY RECOVERY HOURLY - NURSING UNIT

## 2024-09-05 PROCEDURE — 71045 X-RAY EXAM CHEST 1 VIEW: CPT

## 2024-09-05 PROCEDURE — 2500000001 HC RX 250 WO HCPCS SELF ADMINISTERED DRUGS (ALT 637 FOR MEDICARE OP): Performed by: STUDENT IN AN ORGANIZED HEALTH CARE EDUCATION/TRAINING PROGRAM

## 2024-09-05 PROCEDURE — 80069 RENAL FUNCTION PANEL: CPT | Performed by: STUDENT IN AN ORGANIZED HEALTH CARE EDUCATION/TRAINING PROGRAM

## 2024-09-05 RX ORDER — IBUPROFEN 600 MG/1
600 TABLET ORAL EVERY 6 HOURS PRN
COMMUNITY

## 2024-09-05 RX ORDER — LEVOTHYROXINE SODIUM 50 UG/1
50 TABLET ORAL DAILY
Qty: 90 TABLET | Refills: 1 | Status: SHIPPED | OUTPATIENT
Start: 2024-09-05

## 2024-09-05 RX ORDER — TRAMADOL HYDROCHLORIDE 50 MG/1
50 TABLET ORAL EVERY 6 HOURS PRN
Qty: 28 TABLET | Refills: 0 | Status: SHIPPED | OUTPATIENT
Start: 2024-09-05 | End: 2024-09-05

## 2024-09-05 RX ORDER — ACETAMINOPHEN 325 MG/1
650 TABLET ORAL EVERY 6 HOURS
COMMUNITY
Start: 2024-09-05 | End: 2024-09-05

## 2024-09-05 RX ORDER — ACETAMINOPHEN 325 MG/1
650 TABLET ORAL EVERY 6 HOURS PRN
Qty: 40 TABLET | Refills: 0 | Status: SHIPPED | OUTPATIENT
Start: 2024-09-05 | End: 2024-09-10

## 2024-09-05 RX ORDER — TRAMADOL HYDROCHLORIDE 50 MG/1
50 TABLET ORAL EVERY 6 HOURS PRN
Qty: 12 TABLET | Refills: 0 | Status: SHIPPED | OUTPATIENT
Start: 2024-09-05 | End: 2024-09-08

## 2024-09-05 ASSESSMENT — ACTIVITIES OF DAILY LIVING (ADL): LACK_OF_TRANSPORTATION: PATIENT DECLINED

## 2024-09-05 ASSESSMENT — PAIN DESCRIPTION - ORIENTATION: ORIENTATION: RIGHT

## 2024-09-05 ASSESSMENT — PAIN SCALES - GENERAL
PAINLEVEL_OUTOF10: 5 - MODERATE PAIN
PAINLEVEL_OUTOF10: 4

## 2024-09-05 ASSESSMENT — PAIN DESCRIPTION - LOCATION: LOCATION: NECK

## 2024-09-05 ASSESSMENT — PAIN - FUNCTIONAL ASSESSMENT: PAIN_FUNCTIONAL_ASSESSMENT: 0-10

## 2024-09-05 NOTE — DISCHARGE SUMMARY
"Discharge Diagnosis  Substernal thyroid goiter    Test Results Pending At Discharge  Pending Labs       Order Current Status    Surgical Pathology Exam In process            Hospital Course   74 yr old female with substernal thyroid goiter who underwent cervical resection of substernal goiter on 9/4 with Dr Perez.  Post-op course uncomplicated.  Post-op Ca levels 9.0.  DC home POD 1.      Pertinent Physical Exam At Time of Discharge  Neurological: Awake, alert, conversive  Head/Neck: incision with primary dressing in place - removed - well approx with steri strips; no erythema, drainage, swelling noted.  negative Chovsteks.  no perioral numbness/tingling.  Respiratory/Thorax: even, unlabored  Genitourinary: voiding  Skin: warm, dry  Musculoskeletal: MORFIN  Extremities: no numbness/tingling   Psychological: appropriate mood/affect    /52 (BP Location: Right arm, Patient Position: Lying)   Pulse 74   Temp 36.6 °C (97.9 °F) (Temporal)   Resp 18   Ht 1.6 m (5' 3\")   Wt 106 kg (234 lb 9.1 oz)   SpO2 98%   BMI 41.55 kg/m²      Home Medications     Medication List      START taking these medications     acetaminophen 325 mg tablet; Commonly known as: Tylenol; Take 2 tablets   (650 mg) by mouth every 6 hours if needed for mild pain (1 - 3) for up to   5 days.   traMADol 50 mg tablet; Commonly known as: Ultram; Take 1 tablet (50 mg)   by mouth every 6 hours if needed for severe pain (7 - 10) for up to 3   days.     CHANGE how you take these medications     * levothyroxine 75 mcg tablet; Commonly known as: Synthroid, Levoxyl;   What changed: Another medication with the same name was added. Make sure   you understand how and when to take each.   * levothyroxine 50 mcg tablet; Commonly known as: Synthroid, Levoxyl;   Take 1 tablet (50 mcg) by mouth early in the morning.. Take on an empty   stomach at the same time each day, either 30 to 60 minutes prior to   breakfast. Please take with your home pill of 75mcg, so " it will make 125   mcg every morning.; What changed: You were already taking a medication   with the same name, and this prescription was added. Make sure you   understand how and when to take each.  * This list has 2 medication(s) that are the same as other medications   prescribed for you. Read the directions carefully, and ask your doctor or   other care provider to review them with you.     CONTINUE taking these medications     ascorbic acid 500 mg tablet; Commonly known as: Vitamin C   ibuprofen 600 mg tablet   lisinopril 40 mg tablet   metoprolol tartrate 50 mg tablet; Commonly known as: Lopressor   naproxen 500 mg tablet; Commonly known as: Naprosyn   rosuvastatin 5 mg tablet; Commonly known as: Crestor     STOP taking these medications     clindamycin 300 mg capsule; Commonly known as: Cleocin   oxyCODONE 5 mg immediate release tablet; Commonly known as: Roxicodone       Outpatient Follow-Up  Future Appointments   Date Time Provider Department Center   9/30/2024 10:45 AM Walter Perez MD [id number] [facility]       Lili Urena, APRN-CNP

## 2024-09-05 NOTE — PROGRESS NOTES
Pharmacy Medication History Review    Sharon Duenas is a 74 y.o. female admitted for Substernal thyroid goiter. Pharmacy reviewed the patient's [id number] medications and allergies for accuracy.    The list below reflects the updated PTA list. Comments regarding how patient may be taking medications differently can be found in the Admit Orders Activity  Prior to Admission Medications   Prescriptions Informant Patient/Chart  Reported?   ascorbic acid (Vitamin C) 500 mg tablet Self Yes   Sig: Take 1 tablet (500 mg) by mouth 2 times a day.      ibuprofen 600 mg tablet Self PRN   Sig: Take 1 tablet (600 mg) by mouth every 6 hours if needed   (pain). Alternate with naproxen   levothyroxine (Synthroid, Levoxyl) 75 mcg tablet Self Yes   Sig: Take 1 tablet (75 mcg) by mouth once daily.   lisinopril 40 mg tablet Self Yes   Sig: Take 1 tablet (40 mg) by mouth once daily.   metoprolol tartrate (Lopressor) 50 mg tablet Self Yes   Sig: Take 0.5 tablets by mouth 2 times a day.   naproxen (Naprosyn) 500 mg tablet Self PRN   Sig: Take 1 tablet (500 mg) by mouth once daily as needed   (pain). Alternate with ibuprofen      rosuvastatin (Crestor) 5 mg tablet Self Yes   Sig: Take 1 tablet (5 mg) by mouth once daily.      Facility-Administered Medications: None        The list below reflects the updated allergy list. Please review each documented allergy for additional clarification and justification.  Allergies  Reviewed by Rudy Kulkarni on 9/4/2024        Severity Reactions Comments    Codeine Not Specified Nausea/vomiting     Penicillin Not Specified Hives     Sulfa (sulfonamide Antibiotics) Not Specified Unknown             Patient declines M2B at discharge. Pharmacy has been updated to [facility] [place], [place], [facility], [phone].    Sources used to complete the med history include:  Patient interviewed about medications taking, alert, cooperative, pleasant, conversive.   Patient knew medications and  doses  Epic Dispense Report reviewed   Care Everywhere reviewed   Chart Review     Below are additional concerns with the patient's PTA list.  None    ---------------------------------  Rudy Kulkarni CPhT  Transitions of Care Technician  Medication reconciliation complete  Please reach out via [facility] Secure Chat for questions,   or if no response call [employer] or [email or web address].  [facility]

## 2024-09-05 NOTE — PROGRESS NOTES
09/05/24 0800   Discharge Planning   Living Arrangements Spouse/significant other   Support Systems Spouse/significant other;Children   Type of Residence Private residence   Home or Post Acute Services None   Expected Discharge Disposition Home   Financial Resource Strain   How hard is it for you to pay for the very basics like food, housing, medical care, and heating? Pt Declined   Housing Stability   In the last 12 months, was there a time when you were not able to pay the mortgage or rent on time? Pt Declined   In the past 12 months, how many times have you moved where you were living? 0   At any time in the past 12 months, were you homeless or living in a shelter (including now)? Pt Declined   Transportation Needs   In the past 12 months, has lack of transportation kept you from medical appointments or from getting medications? Pt Declined   In the past 12 months, has lack of transportation kept you from meetings, work, or from getting things needed for daily living? Pt Declined     Sharon Duenas is a 74 y.o. female on day 1 of admission presenting with Substernal thyroid goiter.     Met with patient and introduced myself as care coordinator and member of the care transitions team for discharge planning. Per the medical team, this patient has no anticipated discharge needs; full assessment deferred at this time. Will continue to follow for any home going needs that arise. ADOD 9/5 HNN. Eva Mcwilliams RN

## 2024-09-05 NOTE — PROGRESS NOTES
"Sharon Duenas is a 74 y.o. female on day 1 of admission presenting with Substernal thyroid goiter.    Subjective   Patient reported feeling congested in her lungs and coughing , she also reports mild pain in the surgical site, patient still have soreness in her throat but improved from yesterday       Objective     Physical Exam  Constitutional:       Appearance: Normal appearance.   HENT:      Mouth/Throat:      Mouth: Mucous membranes are moist.   Eyes:      Extraocular Movements: Extraocular movements intact.   Neck:      Comments: Dressing over the surgical site intact, no signs of bleeding   Cardiovascular:      Rate and Rhythm: Normal rate.   Pulmonary:      Comments: Non labored breathing RA  Skin:     General: Skin is warm and dry.   Neurological:      Mental Status: She is alert and oriented to person, place, and time.   Psychiatric:         Behavior: Behavior normal.         Last Recorded Vitals  Blood pressure 120/63, pulse (!) 47, temperature 36.6 °C (97.9 °F), temperature source Temporal, resp. rate 18, height 1.6 m (5' 3\"), weight 106 kg (234 lb 9.1 oz), SpO2 94%.  Intake/Output last 3 Shifts:  I/O last 3 completed shifts:  In: 1705 (16 mL/kg) [P.O.:120; I.V.:335 (3.1 mL/kg); Blood:250; IV Piggyback:1000]  Out: 10 (0.1 mL/kg) [Blood:10]  Weight: 106.4 kg     Relevant Results      Results for orders placed or performed during the hospital encounter of 09/04/24 (from the past 24 hour(s))   Verify ABO/Rh Group Test (VERAB)   Result Value Ref Range    ABO TYPE A     Rh TYPE POS    Blood Gas Venous Full Panel Unsolicited   Result Value Ref Range    POCT pH, Venous 7.43 7.33 - 7.43 pH    POCT pCO2, Venous 42 41 - 51 mm Hg    POCT pO2, Venous 50 (H) 35 - 45 mm Hg    POCT SO2, Venous 82 (H) 45 - 75 %    POCT Oxy Hemoglobin, Venous 79.8 (H) 45.0 - 75.0 %    POCT Hematocrit Calculated, Venous 48.0 (H) 36.0 - 46.0 %    POCT Sodium, Venous 135 (L) 136 - 145 mmol/L    POCT Potassium, Venous 4.2 3.5 - 5.3 " mmol/L    POCT Chloride, Venous 103 98 - 107 mmol/L    POCT Ionized Calicum, Venous 1.22 1.10 - 1.33 mmol/L    POCT Glucose, Venous 115 (H) 74 - 99 mg/dL    POCT Lactate, Venous 1.1 0.4 - 2.0 mmol/L    POCT Base Excess, Venous 3.1 (H) -2.0 - 3.0 mmol/L    POCT HCO3 Calculated, Venous 27.9 (H) 22.0 - 26.0 mmol/L    POCT Hemoglobin, Venous 16.1 (H) 12.0 - 16.0 g/dL    POCT Anion Gap, Venous 8.0 (L) 10.0 - 25.0 mmol/L    Patient Temperature 37.0 degrees Celsius    FiO2 21 %   Calcium, Ionized   Result Value Ref Range    POCT Calcium, Ionized 1.10 1.1 - 1.33 mmol/L   Renal Function Panel   Result Value Ref Range    Glucose 132 (H) 74 - 99 mg/dL    Sodium 138 136 - 145 mmol/L    Potassium 4.3 3.5 - 5.3 mmol/L    Chloride 103 98 - 107 mmol/L    Bicarbonate 24 21 - 32 mmol/L    Anion Gap 15 10 - 20 mmol/L    Urea Nitrogen 19 6 - 23 mg/dL    Creatinine 0.97 0.50 - 1.05 mg/dL    eGFR 61 >60 mL/min/1.73m*2    Calcium 9.0 8.6 - 10.6 mg/dL    Phosphorus 3.7 2.5 - 4.9 mg/dL    Albumin 4.4 3.4 - 5.0 g/dL                   Assessment/Plan   Assessment & Plan  Substernal thyroid goiter    Chronic renal impairment, stage 3 (moderate) (Multi)     A/P: 74 yof 4 hours s/p thyroidectomy via substernal cervical approach, patient is doing well post operatively.      Plan:  NEURO/PAIN/SEDATION: post op pain   -tylenol q6h, oxy5 q4h prn , tramadol 50 q6h prn prn   -pain assessment per shift     RESPIRATORY:   -on RA  -IS  -chest xray pending      CARDIOVASC: HTN  -continue metoprolol 50mg BID  -EKG prn for ACS symptoms     GI:   -no intervention indicated     :   -strict I/O     FEN:   -RFP, Ionized calcium / replete prn  -regular diet      HEMATOLOGIC:   -CBC   -no indication for transfusion      ENDOCRINE:   -levothyroxine 75mcg daily     MUSCULOSKELETAL/SKIN:   -encourage ambulation     INFECTIOUS DISEASE:   -no intervention indicated     GI PROPHYLAXIS:   -no intervention indicated     DVT PROPHYLAXIS:   SCDs     DISPOSITION: RNF            Vicki Bullock, [facility]  Surgical oncology   [facility] team [phone]

## 2024-09-13 LAB
LABORATORY COMMENT REPORT: NORMAL
PATH REPORT.FINAL DX SPEC: NORMAL
PATH REPORT.GROSS SPEC: NORMAL
PATH REPORT.RELEVANT HX SPEC: NORMAL
PATH REPORT.TOTAL CANCER: NORMAL

## 2024-09-19 ENCOUNTER — TELEPHONE (OUTPATIENT)
Dept: SURGERY | Facility: CLINIC | Age: 74
End: 2024-09-19
Payer: MEDICARE

## 2024-09-19 NOTE — TELEPHONE ENCOUNTER
Return call to patient who had questions regarding pathology results, incisional healing, and follow up with her endocrinologist. All questions addressed. Patient will see Dr. Perez 9/30 for post-op appointment.

## 2024-09-30 ENCOUNTER — APPOINTMENT (OUTPATIENT)
Dept: SURGERY | Facility: CLINIC | Age: 74
End: 2024-09-30
Payer: MEDICARE

## 2024-09-30 ENCOUNTER — LAB (OUTPATIENT)
Dept: LAB | Facility: LAB | Age: 74
End: 2024-09-30
Payer: MEDICARE

## 2024-09-30 VITALS
BODY MASS INDEX: 42.87 KG/M2 | WEIGHT: 242 LBS | HEART RATE: 87 BPM | SYSTOLIC BLOOD PRESSURE: 143 MMHG | DIASTOLIC BLOOD PRESSURE: 85 MMHG

## 2024-09-30 DIAGNOSIS — E89.0 S/P TOTAL THYROIDECTOMY: Primary | ICD-10-CM

## 2024-09-30 DIAGNOSIS — E89.0 S/P TOTAL THYROIDECTOMY: ICD-10-CM

## 2024-09-30 LAB — TSH SERPL-ACNC: 3.86 MIU/L (ref 0.44–3.98)

## 2024-09-30 PROCEDURE — 1036F TOBACCO NON-USER: CPT | Performed by: SURGERY

## 2024-09-30 PROCEDURE — 36415 COLL VENOUS BLD VENIPUNCTURE: CPT

## 2024-09-30 PROCEDURE — 84443 ASSAY THYROID STIM HORMONE: CPT

## 2024-09-30 PROCEDURE — 99024 POSTOP FOLLOW-UP VISIT: CPT | Performed by: SURGERY

## 2024-09-30 PROCEDURE — 1111F DSCHRG MED/CURRENT MED MERGE: CPT | Performed by: SURGERY

## 2024-09-30 PROCEDURE — 3077F SYST BP >= 140 MM HG: CPT | Performed by: SURGERY

## 2024-09-30 PROCEDURE — 1157F ADVNC CARE PLAN IN RCRD: CPT | Performed by: SURGERY

## 2024-09-30 PROCEDURE — 1160F RVW MEDS BY RX/DR IN RCRD: CPT | Performed by: SURGERY

## 2024-09-30 PROCEDURE — 84439 ASSAY OF FREE THYROXINE: CPT

## 2024-09-30 PROCEDURE — 3079F DIAST BP 80-89 MM HG: CPT | Performed by: SURGERY

## 2024-09-30 PROCEDURE — 1159F MED LIST DOCD IN RCRD: CPT | Performed by: SURGERY

## 2024-09-30 NOTE — PROGRESS NOTES
Subjective   Patient ID: Sharon Duenas is a 74 y.o. female who presents for postoperative visit after total thyroidectomy.    HPI I saw Mrs. Duenas back in surgery clinic today.  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  Final pathology showed a large thyroid at 192 g.  Atypical thyroid [place] 15 to 20 g.  This all came back with benign follicular nodular disease 2 lymph nodes removed with the specimen.  Everything came back negative for cancer.    She had no postoperative issues.  Her calcium was 9.0 at time of discharge and she was sent home on thyroid hormone replacement.  Preoperatively she was on 75 mcg and I increased this to 125 mcg daily.  She said that she is having some fatigue with this.  We will send her for thyroid function testing today.  She said that she is scheduled to follow-up with her endocrinologist next month.  They can make additional changes to her thyroid hormone replacement as needed.    She denies any neck pain.  No difficulty with breathing or swallowing.  In fact she feels like her breathing at night especially when laying flat is significantly improved.  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  She has a little bit of numbness in her incision which I told her is typical and should resolve with time.  Her voice is normal.    Review of Systems    Objective   Physical Exam  Vitals reviewed.   Constitutional:       Appearance: Normal appearance.      Comments: Her voice is normal   Neck:      Comments: Neck incision healing well no seroma.  Thyroid surgically absent.  Trachea midline.  Neurological:      Mental Status: She is alert.         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    She had no postop complications.  Overall she is doing very well incision is healing nicely.  Her voice is normal.  No signs or symptoms of hypocalcemia.    She is now on levothyroxine 125 mcg daily.  I will send her to the lab today to check a TSH and a free T4.  She is scheduled to follow-up with her endocrinologist in October.  They can make adjustments to her thyroid hormone replacement as needed.    I did give her instructions for stretching exercises to do twice daily as well as vitamin E oil with soft tissue massage every night before bed to help with her incision.    She can see me on an as-needed basis.         Walter Perez MD 09/30/24 10:52 AM

## 2024-10-01 LAB — T4 FREE SERPL-MCNC: 1.33 NG/DL (ref 0.78–1.48)

## (undated) DEVICE — DRAPE, INSTRUMENT, W/POUCH, STERI DRAPE, 7 X 11 IN, DISPOSABLE, STERILE

## (undated) DEVICE — DRAPE, SHEET, THYROID, W/ARMBOARD COVER, 100 X 121 IN, DISPOSABLE, LF, STERILE

## (undated) DEVICE — SUTURE, SILK, 2-0, 18 IN, BLACK

## (undated) DEVICE — MANIFOLD, 4 PORT NEPTUNE STANDARD

## (undated) DEVICE — DRESSING, ADHESIVE, ISLAND, TELFA, 2 X 3.75 IN, LF

## (undated) DEVICE — SPONGE, LAP, XRAY DECT, 18IN X 18IN, W/LOOP, STERILE

## (undated) DEVICE — DRAPE, PAD, INSTRUMENT, MAGNETIC, MEDIUM, 10 X 16 IN, DISPOSABLE

## (undated) DEVICE — TOWEL, SURGICAL, NEURO, O/R, 16 X 26, BLUE, STERILE

## (undated) DEVICE — SUTURE, MONOCRYLIC, 4-0, P3, MONO 18

## (undated) DEVICE — ADHESIVE, SKIN, MASTISOL, 2/3 CC VIAL

## (undated) DEVICE — NEEDLE, HYPODERMIC, NEEDLE PRO, 25G X 1.5, ORANGE

## (undated) DEVICE — Device

## (undated) DEVICE — COUNTER, NEEDLE, FOAM BLOCK, W/MAGNET, W/BLADE GUARD, 10 COUNT, RED, LF

## (undated) DEVICE — SUTURE, PROLENE, 5-0, 36 IN, C-1, CV-11, BLUE

## (undated) DEVICE — APPLICATOR, PREP, CHLORAPREP, W/ORANGE TINT, 10.5ML

## (undated) DEVICE — CLIP, LIGATING, W/ADHESIVE, WIDE SLOT, SMALL, TITANIUM

## (undated) DEVICE — DRAPE, SHEET, MINOR PROCEDURE, T, PEDIATRIC, 100X122X77

## (undated) DEVICE — CLIP, LIGATING, W/ADHESIVE PAD, MEDIUM, TITANIUM

## (undated) DEVICE — ELECTRODE, ELECTROSURGICAL, BLADE, INSULATED, ENT/IMA, STERILE

## (undated) DEVICE — SPONGE, DISSECTOR, PEANUT, 3/8, STERILE 5 FOAM HOLDER"

## (undated) DEVICE — COVER, CART, 45 X 27 X 48 IN, CLEAR

## (undated) DEVICE — DISSECTOR, EXACT, LIGASURE

## (undated) DEVICE — TUBE, BLOOD, VACUETTE, K2EDTA, LAVENDER W/BLK RING, 4ML, POLYETHYLENE, PULL CAP

## (undated) DEVICE — SPONGE GAUZE, XRAY SC+RFID, 4X4 16 PLY, STERILE

## (undated) DEVICE — SPONGE, GAUZE, XRAY DECT, 16 PLY, 4 X 4, W/MASTER DMT,STERILE

## (undated) DEVICE — SUTURE, SILK, 3-0, 18 IN, MULTIPACK, BLACK

## (undated) DEVICE — STRIP, SKIN CLOSURE, STERI STRIP, REINFORCED, 0.5 X 4 IN

## (undated) DEVICE — DRESSING, NON-ADHERENT, TELFA, OUCHLESS, 3 X 8 IN, STERILE

## (undated) DEVICE — GLOVE, SURGICAL, PROTEXIS NEOPRENE, 7.5, PF, LF

## (undated) DEVICE — SUTURE, VICRYL, 3-0, 27 IN, SH